# Patient Record
Sex: FEMALE | Race: WHITE | NOT HISPANIC OR LATINO
[De-identification: names, ages, dates, MRNs, and addresses within clinical notes are randomized per-mention and may not be internally consistent; named-entity substitution may affect disease eponyms.]

---

## 2017-05-16 ENCOUNTER — RECORDS - HEALTHEAST (OUTPATIENT)
Dept: ADMINISTRATIVE | Facility: OTHER | Age: 47
End: 2017-05-16

## 2017-05-16 LAB
BKR LAB AP ABNORMAL BLEEDING: NO
BKR LAB AP BIRTH CONTROL/HORMONES: NORMAL
BKR LAB AP CERVICAL APPEARANCE: NORMAL
BKR LAB AP GYN ADEQUACY: NORMAL
BKR LAB AP GYN INTERPRETATION: NORMAL
BKR LAB AP HPV REFLEX: NORMAL
BKR LAB AP LMP: NORMAL
BKR LAB AP PATIENT STATUS: NO
BKR LAB AP PREVIOUS ABNORMAL: NO
BKR LAB AP PREVIOUS NORMAL: NORMAL
HIGH RISK?: NO
PATH REPORT.COMMENTS IMP SPEC: NORMAL
RESULT FLAG (HE HISTORICAL CONVERSION): NORMAL

## 2018-07-20 ENCOUNTER — RECORDS - HEALTHEAST (OUTPATIENT)
Dept: LAB | Facility: CLINIC | Age: 48
End: 2018-07-20

## 2018-07-20 LAB
ANION GAP SERPL CALCULATED.3IONS-SCNC: 10 MMOL/L (ref 5–18)
BUN SERPL-MCNC: 11 MG/DL (ref 8–22)
CALCIUM SERPL-MCNC: 9.2 MG/DL (ref 8.5–10.5)
CHLORIDE BLD-SCNC: 106 MMOL/L (ref 98–107)
CHOLEST SERPL-MCNC: 197 MG/DL
CO2 SERPL-SCNC: 23 MMOL/L (ref 22–31)
CREAT SERPL-MCNC: 0.68 MG/DL (ref 0.6–1.1)
FASTING STATUS PATIENT QL REPORTED: ABNORMAL
GFR SERPL CREATININE-BSD FRML MDRD: >60 ML/MIN/1.73M2
GLUCOSE BLD-MCNC: 89 MG/DL (ref 70–125)
HDLC SERPL-MCNC: 43 MG/DL
LDLC SERPL CALC-MCNC: 125 MG/DL
POTASSIUM BLD-SCNC: 4.4 MMOL/L (ref 3.5–5)
SODIUM SERPL-SCNC: 139 MMOL/L (ref 136–145)
TRIGL SERPL-MCNC: 145 MG/DL
TSH SERPL DL<=0.005 MIU/L-ACNC: 2.61 UIU/ML (ref 0.3–5)

## 2019-02-27 ENCOUNTER — RECORDS - HEALTHEAST (OUTPATIENT)
Dept: ADMINISTRATIVE | Facility: OTHER | Age: 49
End: 2019-02-27

## 2019-02-27 ENCOUNTER — RECORDS - HEALTHEAST (OUTPATIENT)
Dept: LAB | Facility: CLINIC | Age: 49
End: 2019-02-27

## 2019-02-27 LAB
C REACTIVE PROTEIN LHE: 1.4 MG/DL (ref 0–0.8)
ERYTHROCYTE [SEDIMENTATION RATE] IN BLOOD BY WESTERGREN METHOD: 16 MM/HR (ref 0–20)

## 2019-02-28 ENCOUNTER — AMBULATORY - HEALTHEAST (OUTPATIENT)
Dept: SURGERY | Facility: CLINIC | Age: 49
End: 2019-02-28

## 2019-02-28 DIAGNOSIS — E66.01 MORBID OBESITY (H): ICD-10-CM

## 2019-04-18 ENCOUNTER — OFFICE VISIT - HEALTHEAST (OUTPATIENT)
Dept: SURGERY | Facility: CLINIC | Age: 49
End: 2019-04-18

## 2019-04-18 DIAGNOSIS — E66.01 OBESITY, CLASS III, BMI 40-49.9 (MORBID OBESITY) (H): ICD-10-CM

## 2019-04-18 DIAGNOSIS — G47.33 OSA (OBSTRUCTIVE SLEEP APNEA): ICD-10-CM

## 2019-04-18 RX ORDER — AZELASTINE 1 MG/ML
1 SPRAY, METERED NASAL
Status: SHIPPED | COMMUNITY
Start: 2019-04-18

## 2019-04-18 ASSESSMENT — MIFFLIN-ST. JEOR: SCORE: 1704.77

## 2019-05-17 ENCOUNTER — OFFICE VISIT - HEALTHEAST (OUTPATIENT)
Dept: SURGERY | Facility: CLINIC | Age: 49
End: 2019-05-17

## 2019-05-17 DIAGNOSIS — Z71.3 NUTRITIONAL COUNSELING: ICD-10-CM

## 2019-05-17 DIAGNOSIS — E66.01 OBESITY, CLASS III, BMI 40-49.9 (MORBID OBESITY) (H): ICD-10-CM

## 2019-05-17 ASSESSMENT — MIFFLIN-ST. JEOR: SCORE: 1637.18

## 2019-06-14 ENCOUNTER — OFFICE VISIT - HEALTHEAST (OUTPATIENT)
Dept: SURGERY | Facility: CLINIC | Age: 49
End: 2019-06-14

## 2019-06-14 ENCOUNTER — AMBULATORY - HEALTHEAST (OUTPATIENT)
Dept: LAB | Facility: CLINIC | Age: 49
End: 2019-06-14

## 2019-06-14 DIAGNOSIS — E66.01 OBESITY, CLASS III, BMI 40-49.9 (MORBID OBESITY) (H): ICD-10-CM

## 2019-06-14 LAB — VIT B12 SERPL-MCNC: 459 PG/ML (ref 213–816)

## 2019-06-14 ASSESSMENT — MIFFLIN-ST. JEOR: SCORE: 1602.71

## 2019-06-17 ENCOUNTER — COMMUNICATION - HEALTHEAST (OUTPATIENT)
Dept: SURGERY | Facility: CLINIC | Age: 49
End: 2019-06-17

## 2019-06-17 LAB — 25(OH)D3 SERPL-MCNC: 33.5 NG/ML (ref 30–80)

## 2019-07-16 ENCOUNTER — APPOINTMENT (OUTPATIENT)
Age: 49
Setting detail: DERMATOLOGY
End: 2019-07-17

## 2019-07-16 VITALS — HEIGHT: 64 IN | WEIGHT: 220 LBS | RESPIRATION RATE: 16 BRPM

## 2019-07-16 DIAGNOSIS — D22 MELANOCYTIC NEVI: ICD-10-CM

## 2019-07-16 DIAGNOSIS — L82.1 OTHER SEBORRHEIC KERATOSIS: ICD-10-CM

## 2019-07-16 DIAGNOSIS — D18.0 HEMANGIOMA: ICD-10-CM

## 2019-07-16 DIAGNOSIS — L81.4 OTHER MELANIN HYPERPIGMENTATION: ICD-10-CM

## 2019-07-16 PROBLEM — D22.5 MELANOCYTIC NEVI OF TRUNK: Status: ACTIVE | Noted: 2019-07-16

## 2019-07-16 PROBLEM — D18.01 HEMANGIOMA OF SKIN AND SUBCUTANEOUS TISSUE: Status: ACTIVE | Noted: 2019-07-16

## 2019-07-16 PROCEDURE — 99214 OFFICE O/P EST MOD 30 MIN: CPT

## 2019-07-16 PROCEDURE — OTHER COUNSELING: OTHER

## 2019-07-16 PROCEDURE — OTHER SUNSCREEN RECOMMENDATIONS: OTHER

## 2019-07-16 ASSESSMENT — LOCATION DETAILED DESCRIPTION DERM
LOCATION DETAILED: RIGHT MID-UPPER BACK
LOCATION DETAILED: RIGHT SUPERIOR UPPER BACK
LOCATION DETAILED: RIGHT SUPERIOR MEDIAL UPPER BACK

## 2019-07-16 ASSESSMENT — LOCATION SIMPLE DESCRIPTION DERM: LOCATION SIMPLE: RIGHT UPPER BACK

## 2019-07-16 ASSESSMENT — LOCATION ZONE DERM: LOCATION ZONE: TRUNK

## 2019-08-30 ENCOUNTER — OFFICE VISIT - HEALTHEAST (OUTPATIENT)
Dept: SURGERY | Facility: CLINIC | Age: 49
End: 2019-08-30

## 2019-08-30 DIAGNOSIS — E66.812 CLASS II OBESITY: ICD-10-CM

## 2019-08-30 DIAGNOSIS — G47.33 OSA ON CPAP: ICD-10-CM

## 2019-08-30 ASSESSMENT — MIFFLIN-ST. JEOR: SCORE: 1566.42

## 2019-11-27 ENCOUNTER — RECORDS - HEALTHEAST (OUTPATIENT)
Dept: ADMINISTRATIVE | Facility: OTHER | Age: 49
End: 2019-11-27

## 2019-12-23 ENCOUNTER — COMMUNICATION - HEALTHEAST (OUTPATIENT)
Dept: FAMILY MEDICINE | Age: 49
End: 2019-12-23

## 2019-12-23 DIAGNOSIS — E66.01 OBESITY, CLASS III, BMI 40-49.9 (MORBID OBESITY) (H): ICD-10-CM

## 2020-01-02 ENCOUNTER — COMMUNICATION - HEALTHEAST (OUTPATIENT)
Dept: SURGERY | Facility: CLINIC | Age: 50
End: 2020-01-02

## 2020-01-02 DIAGNOSIS — E66.01 OBESITY, CLASS III, BMI 40-49.9 (MORBID OBESITY) (H): ICD-10-CM

## 2020-03-11 ENCOUNTER — OFFICE VISIT - HEALTHEAST (OUTPATIENT)
Dept: SURGERY | Facility: CLINIC | Age: 50
End: 2020-03-11

## 2020-03-11 DIAGNOSIS — E66.01 OBESITY, CLASS III, BMI 40-49.9 (MORBID OBESITY) (H): ICD-10-CM

## 2020-03-11 ASSESSMENT — MIFFLIN-ST. JEOR: SCORE: 1629.93

## 2020-08-24 ENCOUNTER — APPOINTMENT (OUTPATIENT)
Dept: URBAN - METROPOLITAN AREA CLINIC 260 | Age: 50
Setting detail: DERMATOLOGY
End: 2020-08-25

## 2020-08-24 VITALS — HEIGHT: 63 IN | RESPIRATION RATE: 16 BRPM | WEIGHT: 234 LBS

## 2020-08-24 DIAGNOSIS — L71.0 PERIORAL DERMATITIS: ICD-10-CM

## 2020-08-24 PROCEDURE — 99213 OFFICE O/P EST LOW 20 MIN: CPT

## 2020-08-24 PROCEDURE — OTHER ADDITIONAL NOTES: OTHER

## 2020-08-24 PROCEDURE — OTHER COUNSELING: OTHER

## 2020-08-24 PROCEDURE — OTHER PRESCRIPTION: OTHER

## 2020-08-24 RX ORDER — METRONIDAZOLE 7.5 MG/G
0.75% CREAM TOPICAL BID
Qty: 1 | Refills: 1 | Status: ERX | COMMUNITY
Start: 2020-08-24

## 2020-08-24 RX ORDER — MINOCYCLINE HYDROCHLORIDE 100 MG/1
100MG CAPSULE ORAL BID
Qty: 60 | Refills: 0 | Status: ERX | COMMUNITY
Start: 2020-08-24

## 2020-08-24 ASSESSMENT — LOCATION ZONE DERM: LOCATION ZONE: FACE

## 2020-08-24 ASSESSMENT — LOCATION SIMPLE DESCRIPTION DERM: LOCATION SIMPLE: LEFT CHEEK

## 2020-08-24 ASSESSMENT — LOCATION DETAILED DESCRIPTION DERM: LOCATION DETAILED: LEFT CENTRAL BUCCAL CHEEK

## 2020-08-24 NOTE — PROCEDURE: ADDITIONAL NOTES
Additional Notes: LL recommended discontinuing Fluoride toothpaste to see if that helps or hurts the perioral dermatitis. Explained that patient should come back if the area is not better in one week.
Detail Level: Simple

## 2020-09-17 ENCOUNTER — COMMUNICATION - HEALTHEAST (OUTPATIENT)
Dept: SURGERY | Facility: CLINIC | Age: 50
End: 2020-09-17

## 2020-10-15 ENCOUNTER — OFFICE VISIT - HEALTHEAST (OUTPATIENT)
Dept: SURGERY | Facility: CLINIC | Age: 50
End: 2020-10-15

## 2020-10-15 ENCOUNTER — RECORDS - HEALTHEAST (OUTPATIENT)
Dept: ADMINISTRATIVE | Facility: OTHER | Age: 50
End: 2020-10-15

## 2020-10-15 DIAGNOSIS — E66.01 MORBID OBESITY WITH BMI OF 40.0-44.9, ADULT (H): ICD-10-CM

## 2020-10-15 DIAGNOSIS — G47.33 OSA ON CPAP: ICD-10-CM

## 2020-10-15 DIAGNOSIS — E66.01 OBESITY, CLASS III, BMI 40-49.9 (MORBID OBESITY) (H): ICD-10-CM

## 2020-10-15 RX ORDER — PHENTERMINE HYDROCHLORIDE 37.5 MG/1
TABLET ORAL
Qty: 60 TABLET | Refills: 0 | Status: SHIPPED | OUTPATIENT
Start: 2020-10-15

## 2021-05-27 NOTE — PROGRESS NOTES
[facility]:  Non-Surgical Weight Loss Intake Appointment   Date of visit: 4/18/2019  Physician: Raudel Velazquez MD  Primary Care is Meri Marinelli MD.  Ivett Aden   48 y.o.  female  Ivett is a 48 y.o. year old female who is here today for consultation regarding non-surgical weight loss.   she was referred to the [facility] by Dr. Chavez and Yves.    Weight History:   Wt Readings from Last 3 Encounters:   04/18/19 (!) 245 lb 8 oz (111.4 kg)     Body mass index is 44.19 kg/m .       Assessment and Plan   Assessment: Ivett Aden is a 48 y.o.,female presenting for assistance with medical weight loss.  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     She's been dealing with sensitivities in her calves that she believes relates to some pelvic tilt problems and she's been advised to avoid elliptical use recently and has some lipedema issues that she is planning to follow up with at the vascular clinic once some weight loss has occurred for help w/ compression hose/therapy.       Plan:  1.  Behavior Goals: 3 daily meals plan, ideally with a large breakfast, medium lunch        and smaller dinner. Avoidance of sugared-sweetened beverages and processed        carbohydrate snacks.  Work towards pre-planning meals to avoid falling back into old              habits/obesogenic habits.  Daily Food Tracking and morning weight recommended.  Weekly       check-in through MyChart to increase compliance.    2.  Diet Goals: Recommend an increased protein intake w/ 20g-25g per meal to start until she meets w/ dieticina.  Her smoothie use in the am/lunch has been a little too heavy on fruit and discussed mixing in more veggies/healthy fat and protein if going to use smoothie.      3.  Exercise/Activity Goals: limited currently but has home fitness gym and will work on upper body exercises as tolerated, gets about 10k steps daily at work..  Long term goal of increasing endurance to allow for at least  200 minutes weekly of moderate exercise to maintain weight loss.      4.  Recommendation regarding weight loss medication:  Trial of half tab phentermine until our follow up.     5.  Referral to Dietician for further education and customization of diet plan.    6.  Stress Reduction: 4, 7, 8 breathing encouraged and demonstrated. In the past relied on Friday night wine which we'll look to reduce to sporadic use.    7.  Intake Labs:  States she just had some labs done at her PCP at [facility], we'll try to get a release of these and add tests if needed.      8.   EMMA: on cpap. She was advised that given her EMMA her indication for bariatric surgery extends down to a BMI of 35. She's not interested in surgical tools at this time but may be an option in the future if struggling. Sleeve gastrectomy may be a good option for her given lack of metabolic disease.    There are no diagnoses linked to this encounter.     No follow-ups on file.     Weight and Lifestyle History    Sleep history:  Goes to bed around 11pm, in bed by 10-10:30pm, read/candy crush. Using cpap now. In the night, 20 year old 1-5 times nightly, needs to adjust limbs, after couple minutes falls asleep. Wakes up for the day at 5:15am to an alarm. Once awake, showers, gets son up and showers/dresses him. 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 Most mornings has breakfast around 7:40am toast or smoothie (water, strawberries, bananas, blackberries, spinach, vanilla herbal life powder). Will bring smoothie for lunch or will have salad and chicken. Lunch is 11:45. School out at 3:15pm. Dove chocolate couple daily, sweets. Couple days weekly will go  son at 4pm or 5:15pm (will sit at school). Supper is between 5-6 pm: elk or chicken. Fish. Evening routine is cleaning up, getting kid to scouts/homeworks/chores. 1-2 days weekly TV. Hunt and fish, turkey hunting and deer hunting.  Currently use of elliptical is off limits due to calf/pelvic tilt issues. chiro a couple times monthly and trying to do home exercises but lack of time/energy prevents full compliance w/ hour.  Hours per night: see above, fragmented due to care duties  Snoring/apnea/insomnia? Yes, on cpap now  Restful/refreshed? no  Shift work? no  CPAP/BiPAP? yes  Sleep study previously? yes  TV in bedroom? yes  Sleep aids/pills? no      STOP-BANG score for sleep apnea : na  For general population   EMMA - Low Risk : Yes to 0 - 2 questions  EMMA - Intermediate Risk : Yes to 3 - 4 questions  EMMA - High Risk : Yes to 5 - 8 questions  or Yes to 2 or more of 4 STOP questions + male gender  or Yes to 2 or more of 4 STOP questions + BMI > 35kg/m2  or Yes to 2 or more of 4 STOP questions + neck circumference 17 inches / 43cm in male or 16 inches / 41cm in female    Weight History:  In what way is your excess weight affecting your wellness/health? Legs hurt, sleep apnea  Heaviest weight: near current levels  Any Previous weight loss, what was the most lost and method: WWs online, no previous medication support  Birth weIight, High School graduation weight: 140s  Lowest adult weight: na  Maternal health/smoking/weight: na  When did you start gaining weight and what were the circumstances? Gradual gain  Is anyone else in  "your immediate family overweight? na  Finally,  Is there a weight you desire to be, what is your goal weight that would define successful weight loss for you? Around 200 lbs would be \"fantastic\"   I would like to lose weight so I can  :  Feel better   .     Barriers to weight loss:  Is anyone else at home/work/family a barrier to your weight loss? 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  Current stressors include: care taking  Mobility problems: legs/calves bother her    Counseled on health benefits of weight loss, goals for metabolic/diabetic risk reduction, HTN reduction, improved sleep and mobility, cancer reduction, longevity.    Fitness History:  Were you ever an athlete?na     Which sports? na  When was the last time you walked/hiked a mile?na  Do you have any limitations for activity?calves hurt all the time for unclear reasons.  Do you have access to a gym, pool, club, fitness center, or ?gym at home                Do you have any fitness goals/dreams that could motivate your weight loss?na    On a scale from 0-10,  how willing are you to start some sort of movement/exercise regimen? na    Counseled on physiologic benefits of exercise and for long term weight maintenance, goal working towards 200-300 minutes weekly.     Food History:  Who buys groceries?  She does  Do you eat at dinner table, is the TV on or off, family present? na  Any soda, how much? no  Meals per day? 2-3  Snacks per day? 2  Breakfast typically is: see above, on the go  Lunch typically is: smoothie or chicken on salad brought from home  Dinner  is: meat/veggie, Friday night is pizza night  Weekly Fast Food/dining out: rarely.  Snacks consist of: sweets/chocolate    Food sensitivities/allergies/intolerances/avoidances: no  Water per day? 2 big containers daily.    Any binging behavior?no  Any induced vomiting/purging? no  Any history of anorexia/bulimia? " "no  Any night eating issues? no  Stress induced eating? yes    Goal Setting:  Short Term Goals 10% loss is 24 lbs, under 221 lbs,   Long Term Goals under 200 lbs.          Patient Profile   Social History     Social History Narrative     Not on file     History reviewed. No pertinent family history.       Past Medical History   There is no problem list on file for this patient.    Cephalexin; Clindamycin; and Penicillins  Current Outpatient Medications   Medication Sig     azelastine (ASTELIN) 137 mcg (0.1 %) nasal spray 1 spray.     CALCIUM-MAGNESIUM-ZINC ORAL Take by mouth.     cholecalciferol, vitamin D3, (VITAMIN D3 ORAL) Take 125 mcg by mouth daily.            fluticasone propionate (FLONASE) 50 mcg/actuation nasal spray      Lactobacillus acidophilus (PROBIOTIC ORAL) Take by mouth.     levonorgestrel (MIRENA) 20 mcg/24 hours (5 yrs) 52 mg IUD 1 each by Intrauterine route.     NON FORMULARY daily.     vitamin E acetate (VITAMIN E ORAL) Take by mouth.       Past Surgical History  She has no past surgical history on file.     Examination   /82 (Patient Site: Right Arm, Patient Position: Sitting, Cuff Size: Adult Large)   Pulse 70   Ht 5' 2.5\" (1.588 m)   Wt (!) 245 lb 8 oz (111.4 kg)   SpO2 99%   Breastfeeding? No   BMI 44.19 kg/m    Height: 5' 2.5\" (1.588 m) (4/18/2019 10:25 AM)  Initial Weight: 245.5 lbs (4/18/2019 10:25 AM)  Weight: (!) 245 lb 8 oz (111.4 kg) (4/18/2019 10:25 AM)  Weight loss from initial: 0 (4/18/2019 10:25 AM)  % Weight loss: 0 % (4/18/2019 10:25 AM)  BMI (Calculated): 44.2 (4/18/2019 10:25 AM)  SpO2: 99 % (4/18/2019 10:25 AM)  Waist Circumference (In): 47 Inches (4/18/2019 10:25 AM)  Hip Circumference (In): 55 Inches (4/18/2019 10:25 AM)  Neck Circumference (In): 15 Inches (4/18/2019 10:25 AM)    General:  Alert and ambulatory, appears defensive/tired   HEENT:  No conjunctival pallor, moist mucous Membranes, neck is thick, mallampati 3 airway  Pulmonary:  Normal respiratory " effort, no cough, no audible wheezes/crackles.  CV:  Regular rate and Rhythm, no murmurs, pulses 2 plus  Abdominal: soft, obese, non tender, negative oliveira sign  Extremities: no tremor. Tender legs without pitting edema  Skin:  No pallor or jaundice  Pscyh/Mood: fatigued affect/borderline despondency/careworn.                                    LABS: needs to be ordered    Last recorded labs include:  No results found for: WBC, HGB, HCT, MCV, PLT   No results found for: GITBYJTS16RZ No results found for: HGBA1C   Lab Results   Component Value Date    CHOL 197 2018    No results found for: PTH      No results found for: FERRITIN   Lab Results   Component Value Date    HDL 43 (L) 2018      No results found for: HEBKMPMO25 No results found for: 98388   Lab Results   Component Value Date    LDLCALC 125 2018    Lab Results   Component Value Date    TSH 2.61 2018    No results found for: FOLATE   Lab Results   Component Value Date    TRIG 145 2018    No results found for: ALT, AST, GGT, ALKPHOS, BILITOT No results found for: TESTOSTERONE     No components found for: CHOLHDL No results found for: 7597   @resusfast(vitamin a: 1)@       Other Notables/imaging:     Counselin minutes spent in direct consultation with the patient regarding conditions contributing to excess weight accumulation, with over 50% of the time spent in counseling, goal setting and initiating a plan to lose their excess weight.    Raudel Velazquez MD  [facility].  2019

## 2021-05-28 NOTE — PROGRESS NOTES
Medical  Weight Loss Initial Diet Evaluation    Ivett is a 48 y.o. year old female presenting today for a new weight management nutrition consultation. Pt has also had an initial appointment with Bariatrician Dr. Velazquez.   Patient has signed up for weight watchers and has been successful with this. Weight goal is less than 200lb. Patient is taking phentermine occasionally.   Nutrition Assessment:   Anthropometrics:  Pt's Initial Weight: 245.5 lbs  Weight: (!) 230 lb 9.6 oz (104.6 kg)  Weight loss from initial: 14.9  % Weight loss: 6.07 %    BMI: Body mass index is 41.51 kg/m .  IBW: 110-120lb  Estimated RMR (Catron-St Jeor equation): 1648  Estimated protein needs (.5 grams to .9 grams per pound IBW): 55-99g    Medical History:  There is no problem list on file for this patient.    Nutrition History:   Food allergies/intolerances/restrictions: No  Biggest weight loss struggle per pt: sweet tooth   Vitamins/Mineral Supplementation: vitamin E, vitamin D, calcium-magnesium-zinc, fiber gummies, probiotic, sometimes multivitamin     Dietary Recall:  Breakfast: 2 eggs and mushrooms  Snack: apple  Lunch: protein shake  Snack: none  Dinner: 1 chiledo- tortilla with cheese and chili  Snack: none  Overnight eating: No    Hydration (type/oz. per day):  Water: 48oz   Caffeine/carbonation: 1 can diet soda per day  Juice: none  Alcohol : none    Exercise:  Routine exercise established: No  Daily walking   Nutrition Diagnosis (PES statement):   (NC-3.3.5) Obese, class III, BMI ?40 related to physical inactivity as evidenced by Infrequent, low-duration and or low intensity physical activity; and Large amounts of sedentary activities; no structured physical activity regimen    Nutrition Intervention:  1. Discussed with patient how to build a meal: the importance of including a lean/low fat protein at each meal, include a source of vegetables at a minimum of lunch and dinner, and limiting carbohydrate intake to 1 serving (15  grams) per meal.  2. Placed emphasis on importance of developing a healthy eating routine, aiming for 3 meals a day and no snacks.   3. Educated on sources of lean protein, portion sizes, and the amount of grams found in each source. Recommend pt to aim for 20-30 grams of protein at each meal; 60-80 grams per day.  4. Discussed using a protein supplement as a meal replacement; provided product examples.   5. Encouraged importance of developing routine exercise for health benefits and weight loss.   6. Discussed importance of adequate hydration, with emphasis on drinking 64 oz of water or zero calorie containing beverages per day.   Handouts provided:  Plate Method  List of Lean Protein Sources  Protein Supplements  Nutrition Monitoring/Evaluation:   Nutrition Goal Established by Patient:  1. Increase exercise when able.  2. Follow plate method   Follow up/Monitoring:  Will monitor weight change, protein intake, hydration, fruit and vegetable intake and exercise for next visit.  Follow up with RD in 2 months      Time In: 10:30a  Time Out: 11:00a  ABN: Yes

## 2021-05-29 NOTE — TELEPHONE ENCOUNTER
Left message regarding normal labs, encouraged to average about 2000IUs daily of D3.  Raudel Velazquez MD

## 2021-05-29 NOTE — PATIENT INSTRUCTIONS - HE
"Plan:  1. Great job with nearly 10% weight reduction in just under 2 months. Continue good eating strategy. Aiming for 55-99g of protein daily, max of 35 g in single sitting.  2. Phentermine refilled at [facility].  3. Continue CPAP.   4. Aiming for 64 oz water daily.      Information about the Weight Loss Medication Phentermine    When combined with a commitment to diet and behavior changes, weight loss medications can be a nice additional tool to maximize your weight loss season.  There are no magic pills and without diet and behavior changes, weight loss will be minimal.  Think of this medication as a tool to make your diet and behavior changes easier and you'll enjoy a higher probability of success.  Remember not to skip meals, but use this medication to tolerate your reduced calories more easily.  If you are very hungry in the evenings, you are likely not eating enough in the first 10 hours of your day and need to focus on getting your protein requirements in at each of your 3 daily meals.      Phentermine is a stimulant medication related to the amphetamine class of medication but with a lower risk of dependence and addiction.  It is used for weight loss by suppressing the appetite region of the brain.  It also may speed up the metabolic rate and give a person more energy.  Like any medication there are potential side effects and the most common are:  Dry mouth occurs in almost everyone (hydrate well), fewer people experience Palpatations, fast heart rate, elevation of blood pressure, restlessness, insomnia, dizziness, change in mood, tremor, headache, changes in bowel movements,itchiness, changes in sex drive.    Some people can develop serious side effects which include:  Heart strain (\"ischemia\").  Tachycardia (fast heart rate or irregular heart rate).  Hypertension  Pulmonary Hypertension  Psychosis  Dependency and abuse has occurred in some.  If you've been on high dose (37.5mg) for long periods, phentermine " should be tapered down over a few weeks before abruptly stopping as seizures have been reported rarely.    We do not recommend taking it in combination with the following medications due to potential drug interactions which can increase the risk of side effects and/or potential for seizures:    Absolutely contraindicated are:  Amphetamines or other stimulants like ADHD medication: (dextroamphetamine, amphetamine, diethylpropion, isocarboxazid, methamphetamine, lisdexamfetamine, benzphetamine,dexmethylphenidate, methylphenidate, selegiline patch, sibutramine, tranylcypromine.    Avoid use with:   Dopamine, dobutamine, ephedra, ephedrine, epinephrine, isoproterenol, linezolid, norepinephrine, phenylephrine injection, venlafaxine (Effexor).    Monitor or modify dose with:  Acebutolol, atenolol, betaxolol, bisoprolol, carvedilol, droxidopa, esmolol, labetalol, magnesium citrate, metoprolol, nadolol, nebivolol, penbutolol, pindolol, propranolol, sotalol, timolol.    Caution with: armodafinil,betaxolol eye drops, brexpiprazole, bupropion, busulfan, caffeine, carteolol drops, enzalutaminde, ginseng, green tea, guarana, levobunolol drops, lindane cream, modafinil, afrin nasal spray (oxymetazoline), pamabrom, phenylephrine oral and nasal spray, pseudoephedrine (sudafed), rasgiline, sleegiline, bowel prep, tiagabine, timolol drops,  TRAMADOL due to increased risk of seizures.    The current cheapest place to fill your prescription is at [facility], [facility] or [facility] and is around $30 for 90 tablets.  Occasionally, [facility] and [facility] have price matched, so call around and get the best price for you.  Other pharmacies may charge closer to$70- $100 for the same prescription. You don't have to be a member to use the pharmacy at [facility] currently.  An alternative some patients have tried is using a voucher system through [employer].  $25 paid on their website gets you a  voucher that can allows you to pick your meds up at [facility]  without paying anything more.    Dosing:  We start with half a tablet for the first 2 weeks and if tolerating it without problems, you can take up to one full tablet daily in the morning after breakfast.  For those with evening hunger problems, sometimes half a tablet in the morning and half a tablet around 1-2 pm can be effective, however, risks of nighttime insomnia/restless increase with afternoon dosing so call me at the clinic if considering this regimen or having any issues.  You only have to use the amount effective for you, not to exceed one full tablet.  It can also be used situationaly and does not have to be taken every day. As always, if any questions give us a call at the [facility] telephone:  [phone].      Exercise Guidance    Nearly everything that bothers us gets better when the proper amount of exercise can be done in the proper amounts.  Getting to that level safely and without injury is the key.  When it comes to weight loss, exercise is especially important in maintaining the weight loss.  Unfortunately, one of the harsh realities is that substantial weight loss slows our metabolism, often anywhere from 5-20%.    Our brain always remembers our heaviest weight and we can return to that if we're not mindful and moving regularly.  Our biology doesn't understand the concept of having too much energy, only not having enough.  As such, when we lose weight, it's thought that the brain interprets this as we're ill or in a famine and dials back our metabolism to limit further weight loss.  This is why exercise is so important in keeping the weight off and is the main reason people have some weight regain from their low weight point after weight loss.  We have to make up that 10-20% of calories not being burned.Since we can restrict our intake for only so long, exercise becomes very important in our long term healthy weigh maintenance to balance out the occasional  indiscretion.    Generally, for every 5% body weight reduction in a weight loss season, a person needs to add  kilocalories of exercise in their daily routine to keep that weight off for the long term.  This is why it's vital to be starting your fitness regimen during weight loss season, it becomes so vital for long term success in maintaining that weight loss.    Additionally, all sorts of good enzymes and genes turn on with exercise and our stress, sleep, mood and bodies feel better when we can get to the point of making ourselves a little sweaty and short of breath 35-50 minutes most days of the week. But we have to start with what we can do first and give ourselves permission to work our way up to this goal.    Who isn't ready for exercise? Well, if you get severe dizziness/palpitations, chest pain or short of breath/faint with even minimal activity like walking across a room or you're having to pause while going up a flight of stairs, then getting your heart and/or lungs fully evaluated prior to starting an exercise regimen is recommended. Everyone else can probably start a program, but everyone may start at a different point:  Some can set a 5-10 minute walking goal and others will be able to ride their bike for an hour.      Start with where you're at and look to add 10% more each week until you're at that 150 minutes or more a week (or 75 minutes/week or more of vigorous exercise). Moderate exercise can be estimated as the pace you can carry on a conversation and vigorous is the pace at which you can get 3-5 words out before having to take a breath.  If you're using heart rate monitoring, Moderate is about 60% of your maximum heart rate and vigorous about 75%. (Max heart rate estimated as 220 beats minus your age:  Example: 220-age of 44 =176 Beats per minute (BPM) maximum. 0.6X 176= 105 BPM (moderate), 132 BMP(vigorous)).    If you like to count steps, the 10,000 steps per day does correlate well with  weight maintenance but try to make at least 20-25% of those steps at a brisk pace (like you are about to miss your bus).    If you like to use Apps on the phone, the couch to 5k [place]incoln and 7 minute workout apps are nice places to start if you are reasonably healthy.  There are hundreds of other options out there.  Consider viewing [employer] if gentler exercise/movement is desired. Videos on [place] and chair yoga for seniors exist and are free. Check them out and let's get that 3-4 days a week routine going.    Let's move!  Raudel Velazquez MD.

## 2021-05-29 NOTE — PROGRESS NOTES
"Bariatric Clinic Follow-Up Visit:    Ivett Aden is a 49 y.o.  female with Body mass index is 40.14 kg/m .  presenting here today for follow-up on non-surgical efforts for weight loss. Original Intake visit occurred on 4/18/19 with a weight of 245 lbslbs and BMI of 44.2.  Along with diet and behavior changes, she has been using half tab phentermine to assist her weight loss goals.  See her intake visit notes for details on identified contributors to weight gain in the past.    Weight:   Wt Readings from Last 2 Encounters:   06/14/19 (!) 223 lb (101.2 kg)   05/17/19 (!) 230 lb 9.6 oz (104.6 kg)    pounds  Height: 5' 2.5\" (1.588 m) (6/14/2019 11:01 AM)  Initial Weight: 245 lbs (6/14/2019 11:01 AM)  Weight: (!) 223 lb (101.2 kg) (6/14/2019 11:01 AM)  Weight loss from initial: 22 (6/14/2019 11:01 AM)  % Weight loss: 8.98 % (6/14/2019 11:01 AM)  BMI (Calculated): 40.1 (6/14/2019 11:01 AM)  SpO2: 100 % (6/14/2019 11:01 AM)  Waist Circumference (In): 47 Inches (4/18/2019 10:25 AM)  Hip Circumference (In): 55 Inches (4/18/2019 10:25 AM)  Neck Circumference (In): 15 Inches (4/18/2019 10:25 AM)      Comorbidities:There is no problem list on file for this patient.      Current Outpatient Medications:      azelastine (ASTELIN) 137 mcg (0.1 %) nasal spray, 1 spray., Disp: , Rfl:      CALCIUM-MAGNESIUM-ZINC ORAL, Take by mouth., Disp: , Rfl:      cholecalciferol, vitamin D3, (VITAMIN D3 ORAL), Take 125 mcg by mouth daily.    , Disp: , Rfl:      fluticasone propionate (FLONASE) 50 mcg/actuation nasal spray, , Disp: , Rfl: 6     Lactobacillus acidophilus (PROBIOTIC ORAL), Take by mouth., Disp: , Rfl:      levonorgestrel (MIRENA) 20 mcg/24 hours (5 yrs) 52 mg IUD, 1 each by Intrauterine route., Disp: , Rfl:      NON FORMULARY, daily., Disp: , Rfl:      phentermine (ADIPEX-P) 37.5 mg tablet, Start with 1/2 a tablet daily (18.75mg)., Disp: 60 tablet, Rfl: 0     vitamin E acetate (VITAMIN E ORAL), Take by mouth., Disp: , Rfl: " "      Interim: Since our last visit, she has had wonderful success thus far, down 22 lbs, 9% total body weight. Tolerating phentermine apart from some dry mouth that she's hydrating well w/ water about 48 oz daily and one diet soda most days. Encouraged to drive water intake up to 64 oz. Not much exercise/home gym use yet. Still not \"cleared for PT in the legs\".  RMR of 1648kcal and protein goal of 55-99g/day.  She's down another 7 lbs since working w/ our dietician  5/17/19.   Continues to use cpap regularly.   Plan:   1.  Great job with nearly 10% weight reduction in just under 2 months. Continue good eating strategy. Aiming for 55-99g of protein daily, max of 35 g in single sitting.  2. Phentermine refilled at [email or web address].  3. Continue CPAP.   4. Aiming for 64 oz water daily.      We discussed [facility] Bariatric Basics including:  -eating 3 meals daily  -eating protein first  -eating slowly, chewing food well  -avoiding/limiting calorie containing beverages  -We discussed the importance of restorative sleep and stress management in maintaining a healthy weight.  -We discussed the National Weight Control Registry healthy weight maintenance strategies and ways to optimize metabolism.  -We discussed the importance of physical activity including cardiovascular and strength training in maintaining a healthier weight and explored viable options.    Most recent labs:  No results found for: WBC, HGB, HCT, MCV, PLT  Lab Results   Component Value Date    CHOL 197 07/20/2018     Lab Results   Component Value Date    HDL 43 (L) 07/20/2018     Lab Results   Component Value Date    LDLCALC 125 07/20/2018     Lab Results   Component Value Date    TRIG 145 07/20/2018     No components found for: CHOLHDL  No results found for: ALT, AST, GGT, ALKPHOS, BILITOT  No results found for: HGBA1C  No results found for: ZDYHHXQH09  No results found for: [id number]  No results found for: FERRITIN  No results found for: PTH  No results found " "for: 85305  No results found for: 7597  Lab Results   Component Value Date    TSH 2.61 07/20/2018     No results found for: TESTOSTERONE    DIETARY HISTORY    Positive Changes Since Last Visit: steady w/ diet  Struggling With: exercise/ongoing calf pains.    Knowledgeable in Reading Food Labels: improved  Getting Adequate Protein: improved  Sleeping 7-8 hours/day often  Stress management good    PHYSICAL ACTIVITY PATTERNS:  Cardiovascular: limited  Strength Training: limited    REVIEW OF SYSTEMS  GENERAL/CONSTITUTIONAL:  nl  HEENT:   nl  CARDIOVASCULAR:   nl  PULMONARY:   nl  GASTROINTESTINAL:  nl  UROLOGIC:  nl  NEUROLOGIC:  nl  PSYCHIATRIC:  nl  MUSCULOSKELETAL/RHEUMATOLOGIC   stable calf pain  ENDOCRINE:  na  DERMATOLOGIC:  No rash.    PHYSICAL EXAM:  Vitals: /84 (Patient Site: Right Arm, Patient Position: Sitting, Cuff Size: Adult Large)   Pulse 72   Ht 5' 2.5\" (1.588 m)   Wt (!) 223 lb (101.2 kg)   SpO2 100%   Breastfeeding? No   BMI 40.14 kg/m    Height: 5' 2.5\" (1.588 m) (6/14/2019 11:01 AM)  Initial Weight: 245 lbs (6/14/2019 11:01 AM)  Weight: (!) 223 lb (101.2 kg) (6/14/2019 11:01 AM)  Weight loss from initial: 22 (6/14/2019 11:01 AM)  % Weight loss: 8.98 % (6/14/2019 11:01 AM)  BMI (Calculated): 40.1 (6/14/2019 11:01 AM)  SpO2: 100 % (6/14/2019 11:01 AM)  Waist Circumference (In): 47 Inches (4/18/2019 10:25 AM)  Hip Circumference (In): 55 Inches (4/18/2019 10:25 AM)  Neck Circumference (In): 15 Inches (4/18/2019 10:25 AM)      GEN: Pleasant, well groomed, in no acute distress  HEENT: PEERL, EOMI, airway clear .  NECK: No swelling.  HEART: Rhythm regular, rate regular, no murmur   LUNGS: Clear without crackles or wheezes. No cough.  ABDOMEN: obese..  EXTREMITIES: tender calves, no pitting edema. Varicosities without palpable cords. palpable peripheral pulses, 2 plus radial.  NEURO: Alert and Oriented X3, normal gait and speech.  SKIN: No visible rashes, pallor or jaundice.        25 minutes was " spent in direct consultation, with over 50% of it spent in counseling regarding their plan for excess weight reduction and health modification.  Raudel Velazquez MD  [facility]  5:18 PM

## 2021-05-31 NOTE — PATIENT INSTRUCTIONS - HE
"Plan:  1.  Continue good progress and mindful eating/walking as able.  If the legs don't do as well walking, strength training will help preserve your strength/weight ratio by doing at least 2-3 days weekly.    2. Call when refill of phentermine needed, continue half tablet, blood pressure looks good today.    3. Great job on over 12% total body weight reduction, consider setting reward for breaking the 200 lb abad.     4. Continue cpap for sleep apnea.    Information about the Weight Loss Medication Phentermine    When combined with a commitment to diet and behavior changes, weight loss medications can be a nice additional tool to maximize your weight loss season.  There are no magic pills and without diet and behavior changes, weight loss will be minimal.  Think of this medication as a tool to make your diet and behavior changes easier and you'll enjoy a higher probability of success.  Remember not to skip meals, but use this medication to tolerate your reduced calories more easily.  If you are very hungry in the evenings, you are likely not eating enough in the first 10 hours of your day and need to focus on getting your protein requirements in at each of your 3 daily meals.      Phentermine is a stimulant medication related to the amphetamine class of medication but with a lower risk of dependence and addiction.  It is used for weight loss by suppressing the appetite region of the brain.  It also may speed up the metabolic rate and give a person more energy.  Like any medication there are potential side effects and the most common are:  Dry mouth occurs in almost everyone (hydrate well), fewer people experience Palpatations, fast heart rate, elevation of blood pressure, restlessness, insomnia, dizziness, change in mood, tremor, headache, changes in bowel movements,itchiness, changes in sex drive.    Some people can develop serious side effects which include:  Heart strain (\"ischemia\").  Tachycardia (fast heart " rate or irregular heart rate).  Hypertension  Pulmonary Hypertension  Psychosis  Dependency and abuse has occurred in some.  If you've been on high dose (37.5mg) for long periods, phentermine should be tapered down over a few weeks before abruptly stopping as seizures have been reported rarely.    We do not recommend taking it in combination with the following medications due to potential drug interactions which can increase the risk of side effects and/or potential for seizures:    Absolutely contraindicated are:  Amphetamines or other stimulants like ADHD medication: (dextroamphetamine, amphetamine, diethylpropion, isocarboxazid, methamphetamine, lisdexamfetamine, benzphetamine,dexmethylphenidate, methylphenidate, selegiline patch, sibutramine, tranylcypromine.    Avoid use with:   Dopamine, dobutamine, ephedra, ephedrine, epinephrine, isoproterenol, linezolid, norepinephrine, phenylephrine injection, venlafaxine (Effexor).    Monitor or modify dose with:  Acebutolol, atenolol, betaxolol, bisoprolol, carvedilol, droxidopa, esmolol, labetalol, magnesium citrate, metoprolol, nadolol, nebivolol, penbutolol, pindolol, propranolol, sotalol, timolol.    Caution with: armodafinil,betaxolol eye drops, brexpiprazole, bupropion, busulfan, caffeine, carteolol drops, enzalutaminde, ginseng, green tea, guarana, levobunolol drops, lindane cream, modafinil, afrin nasal spray (oxymetazoline), pamabrom, phenylephrine oral and nasal spray, pseudoephedrine (sudafed), rasgiline, sleegiline, bowel prep, tiagabine, timolol drops,  TRAMADOL due to increased risk of seizures.    The current cheapest place to fill your prescription is at [facility], [facility] or [facility] and is around $30 for 90 tablets.  Occasionally, [facility] and [facility] have price matched, so call around and get the best price for you.  Other pharmacies may charge closer to$70- $100 for the same prescription. You don't have to be a member to use the pharmacy at [facility] currently.   An alternative some patients have tried is using a voucher system through [employer].  $25 paid on their website gets you a  voucher that can allows you to pick your meds up at [facility] without paying anything more.    Dosing:  We start with half a tablet for the first 2 weeks and if tolerating it without problems, you can take up to one full tablet daily in the morning after breakfast.  For those with evening hunger problems, sometimes half a tablet in the morning and half a tablet around 1-2 pm can be effective, however, risks of nighttime insomnia/restless increase with afternoon dosing so call me at the clinic if considering this regimen or having any issues.  You only have to use the amount effective for you, not to exceed one full tablet.  It can also be used situationaly and does not have to be taken every day. As always, if any questions give us a call at the [facility] telephone:  [phone].

## 2021-05-31 NOTE — PROGRESS NOTES
"Bariatric Clinic Follow-Up Visit:    Ivett Aden is a 49 y.o.  female with Body mass index is 38.7 kg/m .  presenting here today for follow-up on non-surgical efforts for weight loss. Original Intake visit occurred on 4/18/19 with a weight of 245 lbs and BMI of 44.2.  Along with diet and behavior changes, she has been using phentermine intermittently to assist her weight loss goals.  See her intake visit notes for details on identified contributors to weight gain in the past.    Weight:   Wt Readings from Last 2 Encounters:   08/30/19 215 lb (97.5 kg)   06/14/19 (!) 223 lb (101.2 kg)    pounds  Height: 5' 2.5\" (1.588 m) (8/30/2019 10:55 AM)  Initial Weight: 245 lbs (8/30/2019 10:55 AM)  Weight: 215 lb (97.5 kg) (8/30/2019 10:55 AM)  Weight loss from initial: 30 (8/30/2019 10:55 AM)  % Weight loss: 12.24 % (8/30/2019 10:55 AM)  BMI (Calculated): 38.7 (8/30/2019 10:55 AM)  SpO2: 99 % (8/30/2019 10:55 AM)  Waist Circumference (In): 47 Inches (4/18/2019 10:25 AM)  Hip Circumference (In): 55 Inches (4/18/2019 10:25 AM)  Neck Circumference (In): 15 Inches (4/18/2019 10:25 AM)      Comorbidities:There is no problem list on file for this patient.      Current Outpatient Medications:      azelastine (ASTELIN) 137 mcg (0.1 %) nasal spray, 1 spray., Disp: , Rfl:      CALCIUM-MAGNESIUM-ZINC ORAL, Take by mouth., Disp: , Rfl:      cholecalciferol, vitamin D3, (VITAMIN D3 ORAL), Take 125 mcg by mouth daily.    , Disp: , Rfl:      fluticasone propionate (FLONASE) 50 mcg/actuation nasal spray, , Disp: , Rfl: 6     Lactobacillus acidophilus (PROBIOTIC ORAL), Take by mouth., Disp: , Rfl:      levonorgestrel (MIRENA) 20 mcg/24 hours (5 yrs) 52 mg IUD, 1 each by Intrauterine route., Disp: , Rfl:      phentermine (ADIPEX-P) 37.5 mg tablet, Start with 1/2 a tablet daily (18.75mg)., Disp: 60 tablet, Rfl: 0     vitamin E acetate (VITAMIN E ORAL), Take by mouth., Disp: , Rfl:      NON FORMULARY, daily., Disp: , Rfl:       Interim: " Since our last visit, she has continued losing weight but notes more holiday eating/Fair trips and a bit slowed weight loss as a result. She's walking more/visited [facility] this summer and continues to be mindful about food/protein.  Tolerating phentermine and not currently in need of refill. Continues to use CPAP.  Legs continue to be sore but walkied [id number] at [facility] and managed hiking a bit in [place] as well. Encouraged to use more strength building focus if legs bother her too much.    Plan:   1. Continue good progress and mindful eating/walking as able.  If the legs don't do as well walking, strength training will help preserve your strength/weight ratio by doing at least 2-3 days weekly.    2. Call when refill of phentermine needed, continue half tablet, blood pressure looks good today.    3. Great job on over 12% total body weight reduction, consider setting reward for breaking the 200 lb abad.     4. Continue cpap for sleep apnea.        We discussed [facility] Bariatric Basics including:  -eating 3 meals daily  -eating protein first  -eating slowly, chewing food well  -avoiding/limiting calorie containing beverages  -We discussed the importance of restorative sleep and stress management in maintaining a healthy weight.  -We discussed the National Weight Control Registry healthy weight maintenance strategies and ways to optimize metabolism.  -We discussed the importance of physical activity including cardiovascular and strength training in maintaining a healthier weight and explored viable options.    Most recent labs:  No results found for: WBC, HGB, HCT, MCV, PLT  Lab Results   Component Value Date    CHOL 197 07/20/2018     Lab Results   Component Value Date    HDL 43 (L) 07/20/2018     Lab Results   Component Value Date    LDLCALC 125 07/20/2018     Lab Results   Component Value Date    TRIG 145 07/20/2018     No components found for: CHOLHDL  No results found for: ALT, AST,  "GGT, ALKPHOS, BILITOT  No results found for: HGBA1C  Lab Results   Component Value Date    [id number] 459 06/14/2019     Lab Results   Component Value Date    [id number] 33.5 06/14/2019     No results found for: FERRITIN  No results found for: PTH  No results found for: 71340  No results found for: 7597  Lab Results   Component Value Date    TSH 2.61 07/20/2018     No results found for: TESTOSTERONE    DIETARY HISTORY    Positive Changes Since Last Visit: high 12% weight reduction  Struggling With: holiday eating this last month    Knowledgeable in Reading Food Labels: yes  Getting Adequate Protein: often  Sleeping 7-8 hours/day yes  Stress management good    PHYSICAL ACTIVITY PATTERNS:  Cardiovascular: some walking intermittently   Strength Training: some    REVIEW OF SYSTEMS  GENERAL/CONSTITUTIONAL:  nl  HEENT:   nl  CARDIOVASCULAR:   nl  PULMONARY:   nl  GASTROINTESTINAL:  nl  UROLOGIC:  nl  NEUROLOGIC:  nl  PSYCHIATRIC:  nl  MUSCULOSKELETAL/RHEUMATOLOGIC   nl  ENDOCRINE:  nl  DERMATOLOGIC:  nl    PHYSICAL EXAM:  Vitals: /66 (Patient Site: Right Arm, Patient Position: Sitting, Cuff Size: Adult Small)   Pulse 76   Ht 5' 2.5\" (1.588 m)   Wt 215 lb (97.5 kg)   SpO2 99%   BMI 38.70 kg/m    Height: 5' 2.5\" (1.588 m) (8/30/2019 10:55 AM)  Initial Weight: 245 lbs (8/30/2019 10:55 AM)  Weight: 215 lb (97.5 kg) (8/30/2019 10:55 AM)  Weight loss from initial: 30 (8/30/2019 10:55 AM)  % Weight loss: 12.24 % (8/30/2019 10:55 AM)  BMI (Calculated): 38.7 (8/30/2019 10:55 AM)  SpO2: 99 % (8/30/2019 10:55 AM)  Waist Circumference (In): 47 Inches (4/18/2019 10:25 AM)  Hip Circumference (In): 55 Inches (4/18/2019 10:25 AM)  Neck Circumference (In): 15 Inches (4/18/2019 10:25 AM)      GEN: Pleasant, well groomed, in no acute distress  HEENT: PEERL, EOMI, airway clear .  NECK: No swelling.  HEART: Rhythm regular, rate regular, no murmur   LUNGS: Clear without crackles or wheezes. No cough..  ABDOMEN: " obese.  EXTREMITIES: tender calves at baseline,  palpable peripheral pulses, 2plus radial.  NEURO: Alert and Oriented X3, normal gait and speech.  SKIN: No visible rashes, tanned skin c/w outdoor activities. No pallor or jaundice..        25 minutes was spent in direct consultation, with over 50% of it spent in counseling regarding their plan for excess weight reduction and health modification.  Raudel Velazquez MD  [facility]  11:45 AM

## 2021-06-03 VITALS — WEIGHT: 215 LBS | BODY MASS INDEX: 38.09 KG/M2 | HEIGHT: 63 IN

## 2021-06-03 VITALS — HEIGHT: 63 IN | WEIGHT: 245.5 LBS | BODY MASS INDEX: 43.5 KG/M2

## 2021-06-03 VITALS — WEIGHT: 230.6 LBS | BODY MASS INDEX: 40.86 KG/M2 | HEIGHT: 63 IN

## 2021-06-03 VITALS — BODY MASS INDEX: 39.51 KG/M2 | WEIGHT: 223 LBS | HEIGHT: 63 IN

## 2021-06-04 VITALS
SYSTOLIC BLOOD PRESSURE: 136 MMHG | HEART RATE: 78 BPM | HEIGHT: 63 IN | DIASTOLIC BLOOD PRESSURE: 76 MMHG | OXYGEN SATURATION: 98 % | WEIGHT: 229 LBS | BODY MASS INDEX: 40.57 KG/M2

## 2021-06-04 NOTE — TELEPHONE ENCOUNTER
Called pt to discuss and she says that her pharmacy requested a refill last week.  Upon further investigation, looks like the refill request was sent to her PCP and is still pending.  I let her know that I will re-order it under  and send the request to him.  Pt verbalized understanding.    Camille Rubi RN, CBN  [facility]  P [phone]  F [phone]

## 2021-06-04 NOTE — TELEPHONE ENCOUNTER
Controlled Substance Refill Request  Medication Name:   Requested Prescriptions     Pending Prescriptions Disp Refills     phentermine (ADIPEX-P) 37.5 mg tablet 60 tablet 0     Sig: Start with 1/2 a tablet daily (18.75mg).     Date Last Fill: 06/14/19  Pharmacy:  [id number] [place], [place] - [address]  Submit electronically to pharmacy  Controlled Substance Agreement Date Scanned:   Encounter-Level CSA Scan Date:    There are no encounter-level csa scan date.       Last office visit with prescriber/PCP: Visit date not found OR same dept: Visit date not found OR same specialty: Visit date not found  Last physical: Visit date not found Last MTM visit: Visit date not found

## 2021-06-04 NOTE — TELEPHONE ENCOUNTER
Pts Care Provider: Dr. Velazquez    Caller: Ivett    Phone Number: [phone]  OK to leave message: Yes    Reason for Call: Pt is calling to check on the status of a medication refill request from her pharmacy

## 2021-06-06 NOTE — PATIENT INSTRUCTIONS - HE
Plan:  1. Welcome back and to get to our 200 lb abad goal from last summer, finding our routine with meals/timing and limiting temptation as best we can will help. Aiming for 25 grams of protein 3 meals daily, every 5 hours, hydrating well in between meals and getting your 70-80 oz of water daily.    2. Restart phentermine. Half tablet daily, no later than 1pm if desiring to go to bed by 11pm.    3. Recheck D levels in the summer.    4. Remember not to over restrict, with this style diet, getting about 300-400 calories every 5 hours makes the comfort eating desire less if getting your 25 grams of protein at each meal.            To help lose weight in a safe way and sustainable way, I'd like to start you on a 1300 calorie diet each day.  Understanding that every 3500 calorie deficit adds up to a pound of weight reduction, with the combination of light aerobic exercise, some modest weight training and diet, we should be able to lose around 1 to 2.5lbs weekly depending on your starting height and weight.    Hunger and fatigue are the enemies of weight loss and behavior change in general.  We become much more habit and reactive in our eating when we're hungry and/or tired and frequently have less self control.  It's not a personal failing, it's just body chemistry.   We can combat this by trying to avoid being tired and hungry at the same time.  To help this,  try to front load your calories in the first 10 hours of you day so you get into the fatigued evening hours reasonably full and you can control impulses/mindless eating a lot better and avoid those bedtime snacks/evening treats that the tired brain craves.    Weight loss goes through ups and downs and plateaus but if you stay on the program, you will enjoy success.   Commit to the process, try to be good at least 19 days out of 20 and continue to think about why you're doing this and what you're working towards. If you haven't thought of your reward for hitting  your weight loss goals, think about it now. Using these little victory bribes along the way helps a lot.    Finding a diet that is satisfying and repeatable-- day in and day out, improves success.  An outline of how to break up the day's food is given below.  It is a starting point and example of what a 1300 calorie diet looks like and you can modify the listed foods to suite your particular tastes, but pay attention to portions.   Do not skip breakfast as it will leave you hungry and lead to overeating at some point during the rest of the day.  If you can make Supper the last food for the day more days of the week then not it can help.  That means that those first 10-12 hours of the day and hitting your protein/intake targets for all three meals is vital to your success and evening hunger.    Start reading labels so you know that you're getting what you think you are and start measuring foods so you can eventually look at a portion and understand how it will provide the fuel you want.    Prepping raw veggies after you buy them (washing and putting into bags/tupperware for easy access), cooking several chicken breasts for the next 2-3 lunches and generally being able to grab and go what will keep you on target when time is short will greatly aid your success.  Prepare and plan and success will follow.    Read labels:  for protein portions/yogurt, protein bars etc looking for items with more than 10 grams of protein and less than 10 grams of sugar is very helpful.  Frozen meals should have at least 18 grams of protein, under 10 grams of sugar as well (typically around 300 calories).    Please note: if you've had previous bariatric surgery: wait 20-30 minutes before/after eating to drink your beverages to avoid early fullness/dumping syndrome/worsening malabsorption, early loss of fullness and hunger.  Start with eating your protein first, slow down your meals and chew thoroughly.          1300 calorie diet:  Think  Big Breakfast, Medium Lunch, smaller dinner.    Breakfast goal of 300 calories-350 calories (egg, 1/3 to 1/2 cup cooked old fashioned oatmeal or steel cut oats and berries,3-4oz greek yogurt.)Glass of water and if you like coffee (black) or tea.        Mid morning Snack or part of lunch, about 2-2.5 hrs later: 100 calories (cottage cheese/string cheese/ fruit or banana) and a handful of cruchy/green veggies (cucumber/celery/green peppers/broccoli).  Small glass of water    Lunch:  300 calories (tuna with little salinas (no bread), apple, salad with drizzle of olive oil/balsamic vinegar for example)  Water    Snack:  100 calories.  4-5 oz Greek Yogurt with at least 17 grams of protein per serving.    Or Cottage cheese (lower sodium version preferable). Get this in about 2 hrs before you plan to have dinner. Protein drinks with at least 15-20 grams of protein and less than 6 grams of sugar could be used here to hit protein goals and decrease afternoon/evening hunger.  Glass of water    Dinner:  350 calories (4 oz meat or fish with cooked veggies or salad with minimal dressing, one piece of bread).  Glass of water or unsweetened tea with lemon  Dessert: 100 calories Medium Apple or Small handful of nuts, about 2/3 of an ounce (almonds/walnuts/cashews or pistachios are ideal).   Glass of water.    Try to avoid all soda and juice (low sodium V8 ok once a day).   You can do it!    Choose an activity that is fun/interesting and available to you such as  Going for a swim for 15 minutes, walking 40 minutes, elliptical 20 minutes or cycling 20 minutes as many days a week as you can.  If those times are too long for your fitness level, start at 10 minutes of movement and each week try to increase by 2 minutes each week.  The first 70 minutes a week (10 minutes a day only) of exercise drops the mortality rate of a sedentary person 30%!!!!  Our goal is to work up to 150 minutes or more per week of moderate to vigorous exercise  to  optimize metabolism and prevent weight regain during maintenance. If time is short and your fitness allows it, high intensity interval training can be a nice way to cut the workout time in half:  warm up 2-4 minutes then 3-6 intervals of increased intensity effort (70% of max heart rate) followed by an equal amount or more of recovery before repeating, then 1-3 minutes of cooldown.     10 minutes of weight lifting can be helpful as well or using some body weight exercises like wall squats, pushups (with assistance as needed or standing/wall pushups), seat presses, yoga moves. At least twice weekly helps maintain a good strength to weight ratio, more days is better.  [employer] is a great resource for free video demonstrations.    If you are into strenuous weight lifting or prolonged exercise, use an online calculator for how many calories you've burned and if your exercise is lasting over 60 minutes, replace 20-30% of those calories burned immediately after exercise .  For the more limited exercise (less than 500 calories burned), there is no need to add extra food unless you notice a lot of hunger on your food journal.  Usually  Even a  calorie load after longer workouts is more than adequate.  For longer efforts, hunger will increase if you don't refuel afterwards and can get meal plan off track due to hunger, so replenish immediately after the workout to keep on the diet plan and feeling good.    Exercise example:  If you burned 1000 calories during the exercise, immediately (within 30 minutes) have a snack/replacement beverage totaling 200-300 calories and ideally have a 3:1 ratio of carbohydrate grams to protein grams to keep muscles ready for exercise the next day.  Have your next, full meal within 2-3 hours of exercise.    Tips for success:  KEEP A FOOD JOURNAL and a log of daily weights.  Pencil and paper works fine for most. Otherwise, Myfitnesspal, fitbit, spark, Loseit, garmin are all good tracker  "apps/programs or websites for food/fitness.  Remembering to ask yourself, \"How did my nourishment affect me today\" and comparing \"good days\" to \"hungry days\" to solidify what helps your body, in your life, feel it's best while losing weight.    1.  Prepare proteins ahead of time (broil chicken breasts in bulk so you can grab and go), steel cut oats can be stored in casserole dish/bowl in the fridge for quick scoop in the morning and rewarm in microwave, make use of crock pot recipes (watch salt content).    2.  Drink a 8-12 oz glass of water every 2-3 hours when awake.  We often mistake hunger for thirst, especially when losing weight.    3. Remember your Reward and Motivation when things get hard.    4.  Weigh yourself every morning and record, you'll stay on track better and learn how your body loses weight. Don't worry about 1 or 2 day patterns, but when on track you'll notice good trend downward of weight over 3-4 day segments.    5. Call or use [employer] messaging if problems/concerns .    6.  Find a handful of meals/foods that keep you on track and get into a boring routine that is sustainable for you.    7.  Take a complete multivitamin just to make sure all micronutrients are adequate during weight loss.    8. If losing hair/brittle nails it often means you are not taking enough protein.  Minimum goal is 60 grams daily of protein, most people with normal kidneys do well with upwards of 100-120 grams/day of protein. Consider taking Biotin as supplement or a \"Hair and Nail\" multivitamin.  If you are hypothyroid and losing hair, see you doctor for a check up of your levels if you haven't had one recently.    9.  Getting adequate sleep is very important for starting your day properly, when we are sleep deprived, our morning appetite is suppressed and without eating an adequate breakfast, we overeat later in the day when we're tired.  Our body heals in our sleep and our mental and immune health depends on this " rest.  Aim for 7-8 hours of sleep nightly if possible.  If you sleep is disturbed, perform some introspection on stressors/depression/anxiety/PTSD events or possible sleep disorders and we can trouble shoot solutions/evaulations if issues persist.    Exercise during the day, meditative breathing before bed and after waking and removing the television from the bedroom are easy ways to improve quality of sleep.      10. Relaxation.  Controlled breathing exercises can lower stress levels in the brain.  One technique is 4, 7, 8 breathing:  Place the tip of your tongue behind your front teeth, breath in through your mouth for 4 seconds, Hold it for 7 seconds and breath out slowly, making a leaky tire noise for 8 seconds.  Repeat 4 times.  Ideally do at the start and end of your day or if feeling stressed.  It works and it's why meditation/yoga/martial arts are often very breath based activities.  There are breathing techniques for alertness as well as relaxation out there and they can be quite helpful.          Information about the Weight Loss Medication Phentermine    When combined with a commitment to diet and behavior changes, weight loss medications can be a nice additional tool to maximize your weight loss season.  There are no magic pills and without diet and behavior changes, weight loss will be minimal.  Think of this medication as a tool to make your diet and behavior changes easier and you'll enjoy a higher probability of success.  Remember not to skip meals, but use this medication to tolerate your reduced calories more easily.  If you are very hungry in the evenings, you are likely not eating enough in the first 10 hours of your day and need to focus on getting your protein requirements in at each of your 3 daily meals.      Phentermine is a stimulant medication related to the amphetamine class of medication but with a lower risk of dependence and addiction.  It is used for weight loss by suppressing the  "appetite region of the brain.  It also may speed up the metabolic rate and give a person more energy.  Like any medication there are potential side effects and the most common are:  Dry mouth occurs in almost everyone (hydrate well), fewer people experience Palpatations, fast heart rate, elevation of blood pressure, restlessness, insomnia, dizziness, change in mood, tremor, headache, changes in bowel movements,itchiness, changes in sex drive.    Some people can develop serious side effects which include:  Heart strain (\"ischemia\").  Tachycardia (fast heart rate or irregular heart rate).  Hypertension  Pulmonary Hypertension  Psychosis  Dependency and abuse has occurred in some.  If you've been on high dose (37.5mg) for long periods, phentermine should be tapered down over a few weeks before abruptly stopping as seizures have been reported rarely.    We do not recommend taking it in combination with the following medications due to potential drug interactions which can increase the risk of side effects and/or potential for seizures:    Absolutely contraindicated are:  Amphetamines or other stimulants like ADHD medication: (dextroamphetamine, amphetamine, diethylpropion, isocarboxazid, methamphetamine, lisdexamfetamine, benzphetamine,dexmethylphenidate, methylphenidate, selegiline patch, sibutramine, tranylcypromine.    Avoid use with:   Dopamine, dobutamine, ephedra, ephedrine, epinephrine, isoproterenol, linezolid, norepinephrine, phenylephrine injection, venlafaxine (Effexor).    Monitor or modify dose with:  Acebutolol, atenolol, betaxolol, bisoprolol, carvedilol, droxidopa, esmolol, labetalol, magnesium citrate, metoprolol, nadolol, nebivolol, penbutolol, pindolol, propranolol, sotalol, timolol.    Caution with: armodafinil,betaxolol eye drops, brexpiprazole, bupropion, busulfan, caffeine, carteolol drops, enzalutaminde, ginseng, green tea, guarana, levobunolol drops, lindane cream, modafinil, afrin nasal spray " (oxymetazoline), pamabrom, phenylephrine oral and nasal spray, pseudoephedrine (sudafed), rasgiline, sleegiline, bowel prep, tiagabine, timolol drops,  TRAMADOL due to increased risk of seizures.    The current cheapest place to fill your prescription is at [facility], [employer] or "[employer]" and is around $30 for 90 tablets.  Occasionally, [employer] and [facility] have price matched, so call around and get the best price for you.  Other pharmacies may charge closer to$70- $100 for the same prescription. You don't have to be a member to use the pharmacy at [facility] currently.  An alternative some patients have tried is using a voucher system through [employer].  $25 paid on their website gets you a  voucher that can allows you to pick your meds up at [facility] without paying anything more.    Dosing:  We start with half a tablet for the first 2 weeks and if tolerating it without problems, you can take up to one full tablet daily in the morning after breakfast.  For those with evening hunger problems, sometimes half a tablet in the morning and half a tablet around 1-2 pm can be effective, however, risks of nighttime insomnia/restless increase with afternoon dosing so call me at the clinic if considering this regimen or having any issues.  You only have to use the amount effective for you, not to exceed one full tablet.  It can also be used situationaly and does not have to be taken every day. As always, if any questions give us a call at the [facility] telephone:  [phone].          The Frozen Food Diet  For those on the go who may have relied heavily on fast food in the past, we want to break away from that routine.  But life sometimes may limit our time for shopping/cooking or perhaps your skills in the kitchen are limited.  To help get on the right food, here are some options for using frozen food/reheatable food that may keep you on track with protein/caloric goals and keep things simple as you first jump  "into weight loss season or look to a break from your current meal routine.     The listed foods are examples of what may help keep most on track but your stores may carry different options.  Start reading labels!  As long as your frozen meals have 18-30 grams of protein per meal they should do the job. Experience suggests most of these meals will be around 280-400 calories.   The protein content is the most improtant. For those who are salt sensitive, looking at sodium content is important as well and most of us would do well to keep our daily sodium intake under 2000-2300mg anyway.   My suggestion is to find your favorite 2 frozen meals that fit the protein goal, load up, keep it simple and use them regularly 2 meals daily. A piece of fruit (apple/berries/banana) in the AM between breakfast and lunch and trying not to go more than about 5 hours between your meals should help keep things on track. For heavy exercisers or people with higher resting metabolic rates, you may feel better w/ a protein supplement in mid afternoon.  Try to let supper be your last food of the day and stick to water otherwise. If you absolutely need bubbly beverages, carbonated patel such as [place]/Bubbly/etc (\"essenced water\" without sugar/artificial sweetener) may be refreshing options as well (much better if cold).     Your grocery may carry better/different options then what is listed,  most of those are decent options, just look at the labels to make sure.      EXAMPLES of quick/frozen meals/sides:  Healthy Choice Power Bowls:  Chicken Feta and Farro:  ~$4.00. 310 kcal. Protein 23 grams. 600mg sodium, Fiber 6g. Sugar 2g.   Evol Fire Grilled steak bowls: $4.49. 400 kcal, 20g protein, 520mg sodium  fiber 8, sugar 3.    Evol Chicken Enchilada bake bowl: $4.49. 350 kcal, 21 g protein, 610mg sodium, fiber 7, sugar 3    Frontera Chicken Fajita bowl: $4.99 on sale $3.59. 260 kcal, 22 g protein, 700mg sodium, fiber 8, sugar " 8.    Frontera, Green chili grilled chicken taco bowl $4.99 on sale $3.59. 240 kcal, protein 20g, sodium 710, fiber 6g, sugar 6g.    Frontera chicken and chorizo taco bowl. $4.99 on sale $3.59. 290 kcal, 19g protein. 660 mg sodium, 7g fiber, 6 g sugar.    Melida Fernandoger, Chicken: soy based food. $4.99 for 4 patties. If using for protein source, encourage 2 patties, 280 kcal, 24 g protein 740mg sodium, fiber 6g, sugar 4g. (add to veggie mix for meal gets 310 kcal if 3/4 cup of mediterranean veggie mix).  Feel free to use condiments like ketchup/mustard or salsa.    Francisco Ramonf, chicken bueno: $4.99, $3.59 on sale. 320 kcal, 20g protein, 750mg sodium, 3g fiber, 4 g sugar    eggs: $2.99-$6.99 per dozen. 70 kcal per egg, 6 -7g protein per egg.    365 Meditarrean blend frozen veggies (5 servings of 3/4 cup): $2.69. 25kcal    365 organic broccoli florets. $2.69 4.5 servings of 1 cup. 30 kcal.      Mozarella cheese: sticks 1 oz (6 pack is $3.60 on sale if organic) 80 kcal, 8 g protein,     Salsa: 365 salsa bhandari polk chil: $2.99 per bottle, 15 servings) 2 tablespoons 10 kcal, 0 protein, 200mg sodium. fiber 0, sugar 1    Water, coffee/unsweetened tea only.  Get at least 64 oz daily. Maximum safe amount is generally up to one half of your body weight in pounds in most cases (unless you're on water restriction for cardiac issues).  Fruit: apples, berries, no more than one banana daily, grapes, watermelon/cantaloupe. Keep it to 1-2 servings daily. Serving of berries is a generous handful. Same with grapes.  Melon:  2 good sized wedges.  Wash your berries/grapes/apples before eating.  Depending on your protein and caloric needs, a person could use frozen meals 2-5 times daily. If you're 6 feet 300 lb man,  you'll likely need an extra serving or two compared to a 5 foot, 200 lb women.  Follow your appetite, hunger and energy levels and how they relate to your intake and timing of meals. Weigh yourself daily if possible.  Have a  great season!    Exercise Guidance    Nearly everything that bothers us gets better when the proper amount of exercise can be done in the proper amounts.  Getting to that level safely and without injury is the key.  When it comes to weight loss, exercise is especially important in maintaining the weight loss.  Unfortunately, one of the harsh realities is that substantial weight loss slows our metabolism, often anywhere from 5-20%.    Our brain always remembers our heaviest weight and we can return to that if we're not mindful and moving regularly.  Our biology doesn't understand the concept of having too much energy, only not having enough.  As such, when we lose weight, it's thought that the brain interprets this as we're ill or in a famine and dials back our metabolism to limit further weight loss.  This is why exercise is so important in keeping the weight off and is the main reason people have some weight regain from their low weight point after weight loss.  We have to make up that 10-20% of calories not being burned.Since we can restrict our intake for only so long, exercise becomes very important in our long term healthy weigh maintenance to balance out the occasional indiscretion with our diet.    Generally, for every 5% body weight reduction in a weight loss season, a person needs to add  kilocalories of exercise in their daily routine to keep that weight off for the long term.  This is why it's vital to be starting your fitness regimen during weight loss season, so that routine is well established as you move into your maintenance period.    Additionally, all sorts of good enzymes and genes turn on with exercise and our stress, sleep, mood and bodies feel better when we can get to the point of making ourselves a little sweaty and short of breath 35-50 minutes most days of the week. But we have to start with what we can do first and give ourselves permission to work our way up to this goal.    Who  "isn't ready for exercise? Well, if you get severe dizziness/palpitations, chest pain or short of breath/faint with even minimal activity like walking across a room or you're having to pause while going up a flight of stairs, then getting your heart and/or lungs fully evaluated prior to starting an exercise regimen is recommended. Everyone else can probably start a program, but everyone may start at a different point:  Some can set a 5-10 minute walking goal and others will be able to ride their bike for an hour.      Start with where you're at and look to add 10% more each week until you're at that 150 minutes or more a week (or 75 minutes/week or more of vigorous exercise). Moderate exercise can be estimated as the pace you can carry on a conversation and vigorous is the pace at which you can get 3-5 words out before having to take a breath.  If you're using heart rate monitoring, Moderate is about 60% of your maximum heart rate and vigorous about 75%. (Max heart rate estimated as 220 beats minus your age:  Example: 220-age of 46 =176 Beats per minute (BPM) maximum. 0.6X 176= 105 BPM (moderate), 132 BMP(vigorous)).    If you like to count steps, the 10,000 steps per day does correlate well with weight maintenance but try to make at least 20-25% of those steps at a brisk pace (like you are about to miss your bus).    Finally, if you are pressed for time, it's important to know that some exercise is better than none.  High Intensity Interval training (HIIT) is a good way to get as much out of a short period of working out. If you can't walk, use the stairs, bike or swim; you could use a punching/arm workout regimen for your activity.  The idea with HIIT is to have a 3-6 minute warm up period of low intensity and the 3-6 \"intervals\" where you push the intensity up and then recover and start the next interval. One study showed that 3 intervals of 20 seconds at \"Maximum Effort\" while either biking on a stationary bike " or going up stairs and then having 100 seconds recovery time before the next Maximum Effort was equally as beneficial on cardiovascular fitness development as doing 30 minutes of moderately paced walking 3 days weekly over a 6 week period of time.  So intensity matters. You just need to be able to safely do your desired exercise without injury. There are many great HIIT exercises/routines out there. IF you're not doing much exercise currently, I recommend giving your self 2-3 weeks of moderate exercise, 3 days weekly minimum to get your bones/tendons/muscles used to exercise before going for High Intensity workouts.    If you like to use Apps on the phone, the couch to 5k [place] and 7 minute workout apps are nice places to start if you are reasonably healthy.  There are hundreds of other options out there.  Consider viewing [email or web address] if gentler exercise/movement is desired. Videos on [place] and chair yoga for seniors exist and are free. Check them out and let's get that 3-4 days a week routine going.    Let's move!  Raudel Velazquez MD.         On-the-Go Breakfast Ideas  As of 2015, the latest research shows what a huge impact eating breakfast has on losing weight and feeling your best. People lose more weight when they make breakfast their biggest meal of the day compared to Dinner, but even if you cannot go to that degree, getting a breakfast that has at least 20 grams of protein and even a moderate amount of fat is ideal for maintaining good energy through the day and limits overeating in the evening hours.  The following are some quick and easy suggestions for at least getting something of substance into your body in the morning.  Enjoy!    Eating breakfast within 90 minutes of waking up is an important part of taking care of your body.  After sleeping for hours, your body is in need of fuel.  An ideal breakfast is a combination of protein, whole-grain carbohydrates, or fruit.  Here s why:    -Protein digests very  slowly in the body, helping you feel more satisfied.  -Whole grains provide dietary fiber, which also digests slowly and helps keep your gut clean.  -Fruit is a great source of vitamins, minerals, and fiber.      Each one of these breakfast combinations has between 200-300 calories and 15-20 grams of protein.  Feel free to mix and match!    Protein: Choose  -1/2 cup low-fat cottage cheese  -2 hard boiled eggs , or one cooked in olive oil (low/slow heat).  -1 low fat string cheese stick  -1 Tablespoon natural peanut butter  -EveryRack vegetarian sausage naeem (found in freezer section)  -1 slice lowfat cheese  -6 oz 2% or lowfat Greek yogurt, such as Fage or Oikos.    PLUS    Whole Grains:  Choose   -1 whole wheat English muffin  -1 whole wheat joseph, half  -1/2 Fiber One frozen muffin, thawed  -1/2 Fiber One toaster pastry  -1 whole wheat bagel thin  -1/2 cup Kashi cereal  -1 Kashi waffle (or other whole grain high-fiber waffle)    OR    Fruit: Choose  -1/3 cup blueberries  -1/2 banana (or a plantain- similar to a banana, yet smaller)  -1/2 cup cantaloupe cubes  -1 small apple  -1 small orange  -1/2 cup strawberries    *Adapted from [employer], Fall 20    Ten Breakfasts Under 250 calories    Ideally, getting between 350-600 calories  (depending on starting height and weight)for breakfast is ideal for avoiding hunger later in the day, adjust/add to the following accordingly:    One- 250 calories, 8.5 g protein  1 slice whole-grain toast   1 Tbsp peanut butter    banana    Two- 250 calories, 8 g protein    cup nonfat/lowfat yogurt  1/3rd cup diced no-sugar peaches  1/3rd cup cereal (like Special K, Cheerios, or bran flakes)    Three- 250 calories, 25 g protein  1 egg scrambled with 1 oz skim milk    cup shredded cheddar    whole grain English muffin  1 oz Marion marx  1 tsp margarine spread    Four- 225 calories, 25 g protein  1/2 cup Kashi Go-Lean cereal    cup skim milk mixed with 1 scoop Bariatric  Advantage protein powder    cup no-sugar diced pears    Five- 250 calories, 20 g protein    cup oatmeal prepared with skim milk, 1 scoop protein powder, and sugar-free maple syrup    Six- 200 calories, 5 g protein  1 whole grain waffle, toasted  1 tablespoon creamy peanut or almond butter    Seven-  250 calories, 19 g protein  Breakfast sandwich: 1 slice whole grain toast, cut in half.  Add 1 scrambled egg and one slice cheddar  cheese.    Eight-  250 calories, 15 g protein  2 eggs scrambled with 1/3 cup frozen spinach (heat before adding to eggs) and 2 tablespoons low fat cream cheese.    Nine-  150 calories, 15 g protein  2/3rd cup cottage cheese    cup cantaloupe    Ten- 200 calories, 20 g protein  Fruit smoothie made with 4 oz. nonfat Greek yogurt,   cup berries, 1 scoop protein powder, and 4 oz skim milk.    Ten Lunches Under 250 Calories    Aim for lunch to be around 300-400 calories a day when trying to lose weight and get that protein in!    One- 200 calories, 11 g protein  1/3 cup tuna salad made with light [place] on 1 slice whole grain bread  1 small peeled apple    Two- 250 calories, 16 g protein  1/3 cup lowfat cottage cheese    cup cooked green beans    small fruit cocktail (in natural juice)    Three- 200 calories, 11 g protein    grilled cheese sandwich on whole grain bread with lowfat cheese  2/3rd cup of tomato soup    Four- 250 calories, 22 g protein  Deli wrap: 1 oz sliced turkey, 1 oz sliced ham, 1 oz sliced chicken rolled up with 1 slice low-fat cheese  1 small orange    Five- 250 calories, 28 g protein  2/3rd cup chili with 1 oz shredded cheese  4 saltine crackers    Six- 250 calories, 22 g protein  1 cup fresh spinach with 2 oz chicken, 1/3rd cup mandarin oranges, and 2 tablespoons sliced almonds with 1 tablespoon light vinaigrette dressing    Seven- 200 calories, 11 g protein  1 Tbsp sugar-free preserves and 1 Tbsp peanut butter on 1 slice whole grain toast    cup nonfat/lowfat Greek  yogurt    Eight- 250 calories, 18 g protein  1 small soft-shell chicken taco with 1 oz shredded cheese, lettuce, tomato, salsa, and 1 Tbsp light sour cream    cup black beans    Nine- 225 calories, 13 g protein  2 ounces baked chicken  1/4 cup mashed potatoes    cup green beans    Ten- 200 calories, 21 g protein  Deli joseph: 2 oz roast beef or other deli meat with 1 tsp Jonnathan mayonnaise and sliced tomato, onion, and lettuce  1/3rd cup cottage cheese      Ten Dinners Under 300 calories    If you're eating a large breakfast and medium lunch, keep dinner small.  300-400 calories is ideal for most people depending on their caloric needs.    One- 300 calories, 12 g protein  1-inch thick slice of turkey meatloaf    cup baked butternut squash    Two- 200 calories, 9 g protein  Bread-less BLT: 3 slices turkey marx, sliced tomato, wrapped in a large lettuce leaf    cup peeled fruit    Three- 275 calories, 36 g protein  3 oz roasted chicken    cup cooked broccoli    cup shredded cheddar cheese    cup unsweetened applesauce    Four- 200 calories, 25 g protein  3 oz baked tilapia  1/3rd cup cooked carrots    cup yogurt    Five- 250 calories, 20 g protein  Grilled ham  n  Swiss: spread 2 tsp light margarine on 1 slice of whole grain bread.  Cut bread in half, layer 2 oz deli ham with 1 piece of Swiss cheese and grill until cheese is melted.    cup cooked vegetables    Six- 250 calories, 18 g protein  Vegetarian cheeseburger: 1 [place] cheeseburger topped with lettuce, onion, tomato, and ketchup/mustard    cup sweet potato fries    Seven- 250 calories, 18 g protein  Pork pot roast: 2 oz roasted pork loin, 1/3rd cup roasted carrots,   medium potato, cooked with   cup gravy    Eight- 300 calories, 25 g protein  2 oz meatballs (about 2 small meatballs)    cup spaghetti sauce  1/2 piece toast topped with 1 tsp light margarine and topped with garlic powder, toasted in oven    Nine- 250 calories, 16 g protein  Mexican pizza: one 8  corn  tortilla topped with 2 oz chicken,   cup salsa, 2 tablespoons black beans, 2 tablespoons shredded cheese.  Bake until cheese is melted.    Ten- 250 calories, 22 g protein  Shrimp stir-de oliveira: 3 oz cooked shrimp, 1/6th onion,   pepper,   cup chopped carrots sautéed in 1 tablespoon olive oil, topped with 2 tablespoons stir de oliveira sauce and a pinch of sesame seeds

## 2021-06-06 NOTE — PROGRESS NOTES
"Bariatric Clinic Follow-Up Visit:    Ivett Aden is a 49 y.o.  female with Body mass index is 41.22 kg/m .  presenting here today for follow-up on non-surgical efforts for weight loss. Original Intake visit occurred on 4/18/19 with a weight of 245 lbs and BMI of 44.2.  Along with diet and behavior changes, she has been using phentermine to assist her weight loss goals.  See her intake visit notes for details on identified contributors to weight gain in the past.  She's been lost to follow up since August when she was 215 lbs. RMR of 1648kcal last summer w/ protein goal of 60-90g/day.    Weight:   Wt Readings from Last 2 Encounters:   03/11/20 (!) 229 lb (103.9 kg)   08/30/19 215 lb (97.5 kg)    pounds  Height: 5' 2.5\" (1.588 m) (3/11/2020  2:29 PM)  Initial Weight: 245 lbs (8/30/2019 10:55 AM)  Weight: (!) 229 lb (103.9 kg) (3/11/2020  2:29 PM)  Weight loss from initial: 30 (8/30/2019 10:55 AM)  % Weight loss: 12.24 % (8/30/2019 10:55 AM)  BMI (Calculated): 41.2 (3/11/2020  2:29 PM)  SpO2: 98 % (3/11/2020  2:29 PM)  Waist Circumference (In): 47 Inches (4/18/2019 10:25 AM)  Hip Circumference (In): 55 Inches (4/18/2019 10:25 AM)  Neck Circumference (In): 15 Inches (4/18/2019 10:25 AM)      Comorbidities:  Patient Active Problem List   Diagnosis     EMMA on CPAP     Class II obesity       Current Outpatient Medications:      azelastine (ASTELIN) 137 mcg (0.1 %) nasal spray, 1 spray., Disp: , Rfl:      CALCIUM-MAGNESIUM-ZINC ORAL, Take by mouth., Disp: , Rfl:      cholecalciferol, vitamin D3, (VITAMIN D3 ORAL), Take 125 mcg by mouth daily.    , Disp: , Rfl:      fluticasone propionate (FLONASE) 50 mcg/actuation nasal spray, , Disp: , Rfl: 6     Lactobacillus acidophilus (PROBIOTIC ORAL), Take by mouth., Disp: , Rfl:      levonorgestrel (MIRENA) 20 mcg/24 hours (5 yrs) 52 mg IUD, 1 each by Intrauterine route., Disp: , Rfl:      NON FORMULARY, daily., Disp: , Rfl:      phentermine (ADIPEX-P) 37.5 mg tablet, Start " with 1/2 a tablet daily (18.75mg)., Disp: 60 tablet, Rfl: 0     vitamin E acetate (VITAMIN E ORAL), Take by mouth., Disp: , Rfl:       Interim: Since our last visit, she has gained 14 lbs. .  Previously tracking steps and reports her exercise over these last 7 months has been continuing to get 10-12k per day per her report. Describes increased comfort/holiday eating/high carb eating as main  for her weight gain.  Son is planning to go away to college next year.  She's using situational phentermine, half tab daily but misses a lot of days/forgets. Reviewed RMR data/goals and it turns out she's trying to use yogurt for breakfast and lunch and under restricting. Reviewed goal of 1200-1300kcal/day w/ 70-80g/day protein.   Plan:   1.  Welcome back and to get to our 200 lb abad goal from last summer, finding our routine with meals/timing and limiting temptation as best we can will help. Aiming for 25 grams of protein 3 meals daily, every 5 hours, hydrating well in between meals and getting your 70-80 oz of water daily.    2. Restart phentermine. Half tablet daily, no later than 1pm if desiring to go to bed by 11pm.    3. Recheck D levels in the summer.    4. Remember not to over restrict, with this style diet, getting about 300-400 calories every 5 hours makes the comfort eating desire less if getting your 25 grams of protein at each meal.    We discussed [facility] Bariat[facility] Basics including:  -eating 3 meals daily  -eating protein first  -eating slowly, chewing food well  -avoiding/limiting calorie containing beverages  -We discussed the importance of restorative sleep and stress management in maintaining a healthy weight.  -We discussed the National Weight Control Registry healthy weight maintenance strategies and ways to optimize metabolism.  -We discussed the importance of physical activity including cardiovascular and strength training in maintaining a healthier weight and explored viable options.    Most  "recent labs:  No results found for: WBC, HGB, HCT, MCV, PLT  Lab Results   Component Value Date    CHOL 197 07/20/2018     Lab Results   Component Value Date    HDL 43 (L) 07/20/2018     Lab Results   Component Value Date    LDLCALC 125 07/20/2018     Lab Results   Component Value Date    TRIG 145 07/20/2018     No components found for: CHOLHDL  No results found for: ALT, AST, GGT, ALKPHOS, BILITOT  No results found for: HGBA1C  Lab Results   Component Value Date    KKBTGDCC09 459 06/14/2019     Lab Results   Component Value Date    WJUTBPXA38JI 33.5 06/14/2019     No results found for: FERRITIN  No results found for: PTH  No results found for: 01350  No results found for: 7597  Lab Results   Component Value Date    TSH 2.61 07/20/2018     No results found for: TESTOSTERONE    DIETARY HISTORY    Positive Changes Since Last Visit: continuing to walk regularly.  Struggling With: comfort eating    Knowledgeable in Reading Food Labels: reviewed goals  Getting Adequate Protein: no  Sleeping 7-8 hours/day often 7  Stress management fair    PHYSICAL ACTIVITY PATTERNS:  Cardiovascular: walking  Strength Training: minimal    REVIEW OF SYSTEMS  GENERAL/CONSTITUTIONAL:  nl  HEENT:   nl  CARDIOVASCULAR:   nl  PULMONARY:   nl  GASTROINTESTINAL:  nl  UROLOGIC:  nl  NEUROLOGIC:  nl  PSYCHIATRIC:  Tolerates phentermine. Stress level OK  MUSCULOSKELETAL/RHEUMATOLOGIC   no injury  ENDOCRINE:  Using D3 daily, 4000IUs  DERMATOLOGIC:  No rash  Reviewed toolbox approach to weight loss.  PHYSICAL EXAM:  Vitals: /76 (Patient Site: Right Arm, Patient Position: Sitting, Cuff Size: Adult Large)   Pulse 78   Ht 5' 2.5\" (1.588 m)   Wt (!) 229 lb (103.9 kg)   SpO2 98%   Breastfeeding No   BMI 41.22 kg/m    Height: 5' 2.5\" (1.588 m) (3/11/2020  2:29 PM)  Initial Weight: 245 lbs (8/30/2019 10:55 AM)  Weight: (!) 229 lb (103.9 kg) (3/11/2020  2:29 PM)  Weight loss from initial: 30 (8/30/2019 10:55 AM)  % Weight loss: 12.24 % (8/30/2019 " 10:55 AM)  BMI (Calculated): 41.2 (3/11/2020  2:29 PM)  SpO2: 98 % (3/11/2020  2:29 PM)  Waist Circumference (In): 47 Inches (4/18/2019 10:25 AM)  Hip Circumference (In): 55 Inches (4/18/2019 10:25 AM)  Neck Circumference (In): 15 Inches (4/18/2019 10:25 AM)      GEN: Pleasant, well groomed, in no acute distress  HEENT: PEERL, EOMI, .  NECK: No swelling.  HEART: Rhythm regular, rate regular, no murmur   LUNGS: Clear without crackles or wheezes. No cough.  ABDOMEN: nontender. Obese..  EXTREMITIES: 2 plus palpable peripheral pulses, radial.  NEURO: Alert and Oriented X3, normal gait and speech.  SKIN: No visible rashes.        25 minutes was spent in direct consultation, with over 50% of it spent in counseling regarding their plan for excess weight reduction and health modification.  Raudel Velazquez MD  [facility]  2:32 PM

## 2021-06-11 NOTE — TELEPHONE ENCOUNTER
Called patient to let her know that we would like to see her in follow-up prior to being able to refill her phentermine since it has been over 6 months.  Patient verbalized understanding and we switched her visit to in person because she doesn't want to do it over the phone.  She needs the accountability of stepping on a scale.  Dede Ware RN

## 2021-06-12 NOTE — PATIENT INSTRUCTIONS - HE
Plan:  1. Aiming for 3 meals daily about every 4.5-5.5 hours with supper being your last food of the day. Evening sips of water/tea or essenced carbonated water would be fine (no artificial sweeteners).   Each meal should have about 25 grams of protein daily and try to get 1-2 servings of healthy fat each day (olive oil/nuts/seeds/avocado or fatty fish like salmon/sardines/mackerel).     2. Start walking daily 10-20 minutes outside if possible. Dress appropriately and embrace winter.    3. Restart phentermine at a quarter to half a tablet daily AFTER breakfast and no closer than 12 hours before your bedtime to start. Don't use if sick/ill/using cold medications or other stimulants.    4. Trial of Plenity. Take 3 capsules (one pod) 20-30 minutes before Lunch And Supper with 16 oz of water.  Rx was faxed and they should contact you (see below for flow).     5. Recheck in 6 weeks to check efficacy/tolerance. Try to weigh yourself weekly at minimum.    6. You can recheck your thyroid, metabolic panel/electrolytes, vitamin D levels anytime this fall (orders placed today). [id number] or the [facility] lab can get those done for you if you call them and set up a lab draw vitamin.       Kind Regards,  Raudel Velazquez MD  [facility]        To help lose weight in a safe way and sustainable way, I'd like to start you on a 1300 calorie diet each day.  Understanding that every 3500 calorie deficit adds up to a pound of weight reduction, with the combination of light aerobic exercise, some modest weight training and diet, we should be able to lose around 1 to 2.5lbs weekly depending on your starting height and weight and exercise routine with this goal intake. Dropping abut 1% total body weight weekly is exceptional weight loss and very sustainable once in a good routine.    Hunger and fatigue are the enemies of weight loss and behavior change in general.  We become  much more reactive in our eating when we're hungry and tired and decrease our levels of self control.  It's not a personal failing, it's just body chemistry.   We can combat this by trying to avoid being tired and hungry at the same time.  To help this,  try to front load your calories in the first 10 hours of you day so you get into the fatigued evening hours reasonably full and you can control impulses/mindless eating a lot better and avoid those bedtime snacks/evening treats that the tired brain craves.  If you can getting your exercise in the beginning of your day has also been shown to have superior results (but anytime is better than none).  We want to build up to 150 minutes or more as you progress through the first 2-3 months of weight loss season (300 minutes weekly is ideal for maintaining weight loss for most after the end of weight loss season).     Weight loss goes through ups and downs and plateaus but if you stay on the program, you will enjoy success.   Commit to the process, try to be on track at least 19 days out of 20 and continue to think about why you're doing this and what you're working towards. If you haven't thought of your reward for hitting your weight loss goals, think about it now. Using these little victory bribes along the way helps a lot.    Finding a diet that is satisfying and repeatable-- day in and day out, improves success.  The following uses the concept of Daily Caloric Restriction, eating less every day.  An outline of how to break up the day's food is given below.  It is a starting point and example of what a 1300 calorie diet looks like.  You can modify the listed foods to suite your particular tastes, but pay attention to portions and protein content.   Do not skip breakfast on this plan as it will leave you hungry and lead to overeating at some point during the rest of the day.  If you can make supper the last food for the day more days of the week then not, it will help a  lot.  That means that the intake during those first 10-12 hours of the day and hitting your protein/intake targets for all three meals is vital to your success and evening hunger control.     Start reading labels so you know that you're getting what you think you are and start measuring foods so you can eventually look at a portion and understand how it will provide the fuel you want.    Prepping raw veggies after you buy them (washing and putting into bags/tupperware for easy access), cooking several chicken breasts/proteins for the next 2-3 lunches and generally being able to grab and go what will keep you on target when time is short will greatly aid your success.  Prepare and plan ahead and success will follow. It really only requires a couple days weekly to optimize the access to the right food at the right time of day.  Food delivery and stopping at fast food is a sign of reactive eating and usually will signal a stalling of your weight loss.    Read labels:  for protein portions/yogurt, protein bars etc looking for items with more than 10 grams of protein and less than 10 grams of sugar is very helpful.  Frozen meals should have at least 18 grams of protein, under 10 grams of sugar as well (typically around 300-380 calories).    Please note: if you've had previous bariatric surgery: wait 20-30 minutes before/after eating to drink your beverages to avoid early fullness/dumping syndrome/worsening malabsorption, early loss of fullness and hunger.  Start with eating your protein first, slow down your meals and chew thoroughly.          1300 calorie diet:  Think Big Breakfast, Medium Lunch, smaller dinner.  Note: it's OK to consolidate the calories/protein of the mid morning snack with breakfast and the midafternoon snack with lunch if time doesn't allow or if your don't wish to have those snacks. No snacks recommended after supper. If you're prone to late afternoon nibbling, that is a great time to get your  fitness in so you can get to supper without the extra.    Breakfast goal of 300 calories-350 calories (egg, 1/3 to 1/2 cup cooked old fashioned oatmeal or steel cut oats and berries,3-4oz greek yogurt.)Glass of water and if you like coffee (black) or tea.        Mid morning Snack or part of lunch, about 2-2.5 hrs later: 100 calories (cottage cheese/string cheese/ fruit or banana) and a handful of cruchy/green veggies (cucumber/celery/green peppers/broccoli).  Small glass of water    Lunch:  300 calories (3.5-4 oz tuna with little [place] (no bread), apple, salad with drizzle of olive oil/balsamic vinegar for example)  Water    Snack:  100 calories.  4-5 oz Greek Yogurt with at least 17 grams of protein per serving.    Or Cottage cheese (lower sodium version preferable). Get this in about 2 hrs before you plan to have dinner. Protein drinks with at least 15-20 grams of protein and less than 6 grams of sugar could be used here to hit protein goals and decrease afternoon/evening hunger.  Glass of water    Dinner:  350 calories (4 oz meat or fish with cooked veggies or salad with minimal dressing, one piece of bread).  Glass of water or unsweetened tea with lemon  Dessert: 100 calories Medium Apple or Small handful of nuts, about 2/3 of an ounce (almonds/walnuts/cashews or pistachios are ideal).   Glass of water.    Try to avoid all soda and juice (low sodium V8 ok once a day).   You can do it! Embrace the healthier you and give up the inflammatory sugary treats that accelerate disease.    Choose an activity that is fun/interesting and available to you such as  Going for a swim for 15 minutes, walking 40 minutes, elliptical 20 minutes or cycling 20 minutes as many days a week as you can.  Having a walking or workout jose can make it even more enjoyable and keep you on track the days your motivation/energy may be lower.  If those times are too long for your fitness level, start at 10 minutes of movement and each week try  to increase by 2 minutes each week.  The first 70 minutes a week (10 minutes a day only) of exercise drops the mortality rate of a sedentary person 30%!!!!  Our goal is to work up to 150 minutes or more per week of moderate to vigorous exercise  to optimize metabolism and prevent weight regain during maintenance. If time is short and your fitness allows it, high intensity interval training can be a nice way to cut the workout time in half:  warm up 2-4 minutes then 3-6 intervals of increased intensity effort (70% of max heart rate) followed by an equal amount or more of recovery before repeating, then 1-3 minutes of cooldown.     10 minutes of weight lifting can be helpful as well or using some body weight exercises like wall squats, pushups (with assistance as needed or standing/wall pushups), seat presses, yoga moves. At least twice weekly helps maintain a good strength to weight ratio, more days is better.  [employer] is a great resource for free video demonstrations.    If you are into strenuous weight lifting or prolonged exercise, use an online calculator for how many calories you've burned and if your exercise is lasting over 60 minutes, replace 20-30% of those calories burned immediately after exercise .  For the more limited exercise (less than 500 calories burned), there is no need to add extra food unless you notice a lot of hunger on your food journal.  Usually  Even a  calorie load after longer workouts is more than adequate.  For longer efforts, hunger will increase if you don't refuel afterwards and can get meal plan off track due to hunger, so replenish immediately after the workout to keep on the diet plan and feeling good.    Exercise example:  If you burned 1000 calories during the exercise, immediately (within 30 minutes) have a snack/replacement beverage totaling 200-300 calories and ideally have a 3:1 ratio of carbohydrate grams to protein grams to keep muscles ready for exercise the next  "day.  Have your next, full meal within 2-3 hours of exercise.    Tips for success:  KEEP A FOOD JOURNAL and a log of daily weights.  Pencil and paper works fine for most. Otherwise, Myfitnesspal, fitbit, SweetSpot WiFi, Loseit, garmin are all good tracker apps/programs or websites for food/fitness.  Remembering to ask yourself, \"How did my nourishment affect me today\" and comparing \"good days\" to \"hungry days\" to solidify what helps your body, in your life, feel it's best while losing weight.    1.  Prepare proteins ahead of time (broil chicken breasts in bulk so you can grab and go), steel cut oats can be stored in casserole dish/bowl in the fridge for quick scoop in the morning and rewarm in microwave, make use of crock pot recipes (watch salt content).    2.  Drink a 8-12 oz glass of water every 2-3 hours when awake.  We often mistake hunger for thirst, especially when losing weight.    3. Remember your Reward and Motivation when things get hard.    4.  Weigh yourself every morning and record, you'll stay on track better and learn how your body loses weight. Don't worry about 1 or 2 day patterns, but when on track you'll notice good trend downward of weight over 3-4 day segments.    5. Call or use [employer] messaging if problems/concerns .    6.  Find a handful of meals/foods that keep you on track and get into a boring routine that is sustainable for you.    7.  Take a complete multivitamin just to make sure all micronutrients are adequate during weight loss.    8. If losing hair/brittle nails it often means you are not taking enough protein.  Minimum goal is 60 grams daily of protein, most people with normal kidneys do well with upwards of 100-120 grams/day of protein. Consider taking Biotin as supplement or a \"Hair and Nail\" multivitamin.  If you are hypothyroid and losing hair, see you doctor for a check up of your levels if you haven't had one recently.    9.  Getting adequate sleep is very important for starting your " day properly, when we are sleep deprived, our morning appetite is suppressed and without eating an adequate breakfast, we overeat later in the day when we're tired.  Our body heals in our sleep and our mental and immune health depends on this rest.  Aim for 7-8 hours of sleep nightly if possible.  If you sleep is disturbed, perform some introspection on stressors/depression/anxiety/PTSD events or possible sleep disorders and we can trouble shoot solutions/evaulations if issues persist.    Exercise during the day, meditative breathing before bed and after waking and removing the television from the bedroom are easy ways to improve quality of sleep.      10. Relaxation.  Controlled breathing exercises can lower stress levels in the brain.  One technique is 4, 7, 8 breathing:  Place the tip of your tongue behind your front teeth, breath in through your mouth for 4 seconds, Hold it for 7 seconds and breath out slowly, making a leaky tire noise for 8 seconds.  Repeat 4 times.  Ideally do at the start and end of your day or if feeling stressed.  It works and it's why meditation/yoga/martial arts are often very breath based activities.  There are breathing techniques for alertness as well as relaxation out there and they can be quite helpful.        LEAN PROTEIN SOURCES  Getting 20-30 grams of protein, 3 meals daily, is appropriate for most people, some need more but more than about 40 grams per meal is not useful.  General rule is drinking one ounce of water per gram of protein eaten over the course of the day:  70 grams of protein each day, drink 70 oz of water.  Protein Source Portion Calories Grams of Protein                           Nonfat, plain Greek yogurt    (10 grams sugar or less) 3/4 cup (6 oz)  12-17   Light Yogurt (10 grams sugar or less) 3/4 cup (6 oz)  6-8   Protein Shake 1 shake 110-180 15-30   Skim/1% Milk or lactose-free milk 1 cup ( 8 oz)  8   Plain or light, flavored soymilk 1 cup   7-8   Plain or light, hemp milk 1 cup 110 6   Fat Free or 1% Cottage Cheese 1/2 cup 90 15   Part skim ricotta cheese 1/2 cup 100 14   Part skim or reduced fat cheese slices 1 ounce 65-80 8     Mozzarella String Cheese 1 80 8   Canned tuna, chicken, crab or salmon  (canned in water)  1/2 cup 100 15-20   White fish (broiled, grilled, baked) 3 ounces 100 21   [place]/Tuna (broiled, grilled, baked) 3 ounces 150-180 21   Shrimp, Scallops, Lobster, Crab 3 ounces 100 21   Pork loin, Pork Tenderloin 3 ounces 150 21   Boneless, skinless chicken /turkey breast                          (broiled, grilled, baked) 3 ounces 120 21   [place], [place], [place], and Venison 3 ounces 120 21   Lean cuts of red meat and pork (sirloin,   round, tenderloin, flank, ground 93%-96%) 3 ounces 170 21   Lean or Extra Lean Ground Turkey 1/2 cup 150 20   90-95% Lean [place] Burger 1 naeem 140-180 21   Low-fat casserole with lean meat 3/4 cup 200 17   Luncheon Meats                                                        (turkey, lean ham, roast beef, chicken) 3 ounces 100 21   Egg (boiled, poached, scrambled) 1 Egg 60 7   Egg Substitute 1/2 cup 70 10   Nuts (limit to 1 serving per day)  3 Tbsp. 150 7   Nut [place] (peanut, almond)  Limit to 1 serving or less daily 1 Tbsp. 90 4   Soy Burger (varies) 1  15   Garbanzo, Black, [place] Beans 1/2 cup 110 7   Refried Beans 1/2 cup 100 7   Kidney and Lima beans 1/2 cup 110 7   Tempeh 3 oz 175 18   Vegan crumbles 1/2 cup 100 14   Tofu 1/2 cup 110 14   Chili (beans and extra lean beef or turkey) 1 cup 200 23   Lentil Stew/Soup 1 cup 150 12   Black Bean Soup 1 cup 175 12         50 Things to do Instead of Snacking  We'll all have urges to snack sometimes. Hunger, mood, stress, boredom and distraction are common triggers so being mindful and thinking about what is driving a particular urge to snack at a particular time can be helpful for reducing the urge in the future.  Remember, the urge to snack doesn't  have to be obeyed. The urge exists, but you can watch it pass. Over time, you will get good at the exercise of experiencing an urge, acknowledging it, but letting it pass you by and float away.  Until you get there, here are some activities to pass the 3-5 minutes that most urges last. Good hydration is always helpful.  If you do struggle with impulse/urge control, consider some therapy based on cognitive behavioral therapy or ACT (Acceptance and Commitment Therapy).  Apps/subscriptions like [employer] emphasize some of these tools and can be of help for those able to afford the [place]/subscription.  1. Imagine the new healthier you   2. Walk around the block   3. Call a friend   4. Make a list of your Top Ten Reasons to Lose Weight   5. Make a To Do list   6. Turn on music and dance   7. Jot a thank you note to someone   8. Go to bed early or take a nap  9. Read a book   10. Blog or journal  11. Give yourself a manicure or pedicure   12. Plan a healthy meal for your family   13. Surf the Internet   14. Finish an unfinished project   15. Walk your dog, pet your cat, feed your fish  16. Brush your teeth   17. Balance your checkbook   18. Say a prayer   19. Chop veggies for later use.  20. Give a massage   21. Clean out a junk drawer   22. Play a game with your kids   23. Try a new route on your walk     24. Drink a glass of water   25. Kiss someone   26. Try on some of your clothes   27. Look at old pictures   28. Rent a video   29. Wash your car   30. Take a hot, soothing bath   31. Update your calendar   32. Work in your yard   33. Start your holiday shopping list   34. Count your blessings   35. Write a letter   36. Fold some laundry   37. Check your e-mail   38. Give your dog a bath   39. Send a birthday card   40. Meditate   41. Hug someone   42. Rearrange some furniture   43. Light a fire or some candles   44. Put your pictures in an album   45. Plan a trip (real or imaginary)  46. Straighten a closet   47. Clean out a  "files   48. Visit a friend  49. Clean out your trunk  50. Do something nice for someone     High Intensity Interval Training (HIIT):    To feel our best, our bodies need to move. Our mental health, sleep, memory, immune function, stress coping and metabolic health depend on exercise and its benefits for optimizing how our body works best. In the modern world, most do not get nearly enough exercise.  However, it's important to understand that ANYTHING is better than nothing and if you can get started with a routine for fitness, even a little bit has some benefit compared to none.  The more you do, the better (up to 20 hours weekly, beyond that the benefit tails off), but the NIH guidelines for all adults in [place] is to work up to 150-200 minutes of moderate aerobic activity each week to improve our health.  If you're a person that struggles with time pressure/lack of interest then those 2.5-3 hours weekly may be too much to ask.  So, we have to be very efficient in how we exercise to reap the benefits. It turns out that INTENSITY matters. When we use Vigorous rather than Moderate aerobic activity (Vigorous defined by a heart rate of 70-85% of calculated maximum heart rate; max heart rate equals 220 beats minus your age or the level of effort where you could talk 2-4 words before needing to take a breath), the amount of time required to reap the benefits of exercise is cut in half:  75-90 minutes/week.      A recent study showed that a 10 minute interval workout using a 3-4 minute warm up and then 20 second \"maximum effort\" followed by 1 minute, 40 seconds of recovery and then repeated two more times was as effective in improving fitness as a 30 minute brisk walk.  They utilized exercise bikes or stairs for the effort but you could adapt to whatever geography/gym/pool/bike/hill/staircase that you may have as long as you can safely do the effort without injury.   As your fitness improves, intervals of 30 seconds to 2 " minutes of increased effort followed by 1-2 minutes of recovery are options as well. The fitter you become, the harder and more intervals you'll be able to do.  Start with what you CAN do, not what you WANT to do and that will allow your body to adapt/develop fitness down the road to reach your goals.  Give yourself permission to develop a base of fitness the first 3 weeks and then you should be able to ramp up from there nicely and progressively each week.    Jumping right into a High Intensity Interval workout if you've not been having some level of exercise/fitness recently can increase your risk of injury.  To help develop some base fitness here are some options that would give you a 3-4 week base development, assuming you can walk or ride a stationary bike but haven't been doing much the last few months. 3-4 sessions each week of:      Week Warm up Interval: 3-6 repeats Cooldown   1 3-5 minutes easy walk/spin 20 seconds brisk but doable pace then recover with 90 seconds easy 2-3 minute easy walk/spin   2 3-5 minutes easy walk/spin 25 seconds brisk, 90 seconds recovery 2-3 minute easy walk/spin   3 3-5 minutes easy walk/spin 30 seconds brisk, 90 seconds recovery 2-3 minute easy walk/spin   4 3-5 minutes easy walk/spin 40 seconds brisk, 60 seconds recovery 2-3 minute easy walk/spin     *for base development, keep resistance steady and just  the speed. Once you've completed base phase, feel free to add a little extra resistance to the faster phase to increase vigorousness/heart rate.  During base phase you should be still able to talk comfortable/sing during faster pedaling/walking.     After completing the base phase, start ramping up workouts on the bike/walking. Have one easy pace workout but of longer duration (add 2-3 minutes each week to the time) each week.  One workout weekly should have an interval workout where you push your intensity working up to those 15-30 second maximum efforts and recovering  fully and then repeating for at least 3-4 intervals.  Cool down/easy pedal at the end for 1-2 minutes.     Consider a spin class if you have the means/access and remember, it's OK to rest if needed. Make the workout yours and don't focus on other people's abilities, getting started will develop your own fitness every week.  Jogging plans such as the Couch to 10k or any aerobic training program make use of similar intervals and can help you reach a fitter and more capable body regardless of where/how you perform the intervals (walking/biking/swimming/elliptical/row machine/erg arm machine, peddler, raking, lawn mowing,etc).  Go for it.    Information about the Weight Loss Medication Phentermine    When combined with a commitment to diet and behavior changes, weight loss medications can be a nice additional tool to maximize your weight loss season.  There are no magic pills and without diet and behavior changes, weight loss will be minimal.  Think of this medication as a tool to make your diet and behavior changes easier and you'll enjoy a higher probability of success.  Remember not to skip meals, but use this medication to tolerate your reduced calories more easily.  If you are very hungry in the evenings, you are likely not eating enough in the first 10 hours of your day and need to focus on getting your protein requirements in at each of your 3 daily meals.      Phentermine is a stimulant medication related to the amphetamine class of medication but with a lower risk of dependence and addiction.  It is used for weight loss by suppressing the appetite region of the brain.  It also may speed up the metabolic rate and give a person more energy.  Like any medication there are potential side effects and the most common are:  Dry mouth occurs in almost everyone (hydrate well), fewer people experience Palpatations, fast heart rate, elevation of blood pressure, restlessness, insomnia, dizziness, change in mood, tremor,  "headache, changes in bowel movements,itchiness, changes in sex drive.    Some people can develop serious side effects which include:  Heart strain (\"ischemia\").  Tachycardia (fast heart rate or irregular heart rate).  Hypertension  Pulmonary Hypertension  Psychosis  Dependency and abuse has occurred in some.  If you've been on high dose (37.5mg) for long periods, phentermine should be tapered down over a few weeks before abruptly stopping as seizures have been reported rarely.    We do not recommend taking it in combination with the following medications due to potential drug interactions which can increase the risk of side effects and/or potential for seizures:    Absolutely contraindicated are:  Amphetamines or other stimulants like ADHD medication: (dextroamphetamine, amphetamine, diethylpropion, isocarboxazid, methamphetamine, lisdexamfetamine, benzphetamine,dexmethylphenidate, methylphenidate, selegiline patch, sibutramine, tranylcypromine.    Avoid use with:   Dopamine, dobutamine, ephedra, ephedrine, epinephrine, isoproterenol, linezolid, norepinephrine, phenylephrine injection, venlafaxine (Effexor).    Monitor or modify dose with:  Acebutolol, atenolol, betaxolol, bisoprolol, carvedilol, droxidopa, esmolol, labetalol, magnesium citrate, metoprolol, nadolol, nebivolol, penbutolol, pindolol, propranolol, sotalol, timolol.    Caution with: armodafinil,betaxolol eye drops, brexpiprazole, bupropion, busulfan, caffeine, carteolol drops, enzalutaminde, ginseng, green tea, guarana, levobunolol drops, lindane cream, modafinil, afrin nasal spray (oxymetazoline), pamabrom, phenylephrine oral and nasal spray, pseudoephedrine (sudafed), rasgiline, sleegiline, bowel prep, tiagabine, timolol drops,  TRAMADOL due to increased risk of seizures.    The current cheapest place to fill your prescription is at [facility], [facility] or [facility] and is around $30 for 90 tablets.  Occasionally, [facility] and [facility] have price matched, so " call around and get the best price for you.  Other pharmacies may charge closer to$70- $100 for the same prescription. You don't have to be a member to use the pharmacy at [facility] currently.  An alternative some patients have tried is using a voucher system through [employer].  $25 paid on their website gets you a  voucher that can allows you to pick your meds up at [facility] without paying anything more.    Dosing:  We start with half a tablet for the first 2 weeks and if tolerating it without problems, you can take up to one full tablet daily in the morning after breakfast.  For those with evening hunger problems, sometimes half a tablet in the morning and half a tablet around 1-2 pm can be effective, however, risks of nighttime insomnia/restless increase with afternoon dosing so call me at the clinic if considering this regimen or having any issues.  You only have to use the amount effective for you, not to exceed one full tablet.  It can also be used situationaly and does not have to be taken every day. As always, if any questions give us a call at the [facility] telephone:  [phone].      Process for obtaining Plenity as of Fall of 2020:    Currently, Plenity is only available through a specialty pharmacy and there are a few steps to go through before you can obtain the prescription. Here's the flowsheet provided to me by the supplier, so you can have the proper expectations for how to obtain/refill/cancel your Plenity.    Initial fill    Once we have received the Plentiy fax form from you with your patients' prescription it begins a journey through our workflow process as follows:      The pharmacy technician enters the prescription into our software system.      Our Pharmacists will review the prescription and reach out to you if there are any issues.      Your patient can expect to receive a notification that we have received their prescription within 24 to 48 hours after the  prescription has been verified. We will send a text and/or an email to your patient containing a link. To ensure that there is no delay in processing the prescription you must provide an accurate/valid email and/or cell phone number to us via the Plenity fax form.       Please encourage your patient to respond to the text and/or email as soon as possible so that there is no delay in getting their prescription filled and shipped. The link will direct your patient to create an account, review the prescription order, and then continue through the checkout process by providing payment and shipping information.       If the electronic attempt(s) to reach your patient are unsuccessful we will manually call your patient and place their prescription order for them.     Please let your patients know they can contact us at [phone] or email us at [email or web address] if they have any questions or would need any further assistance.    Refills    Your patients will be enrolled in an automatic refill program for their prescription, but they have the option to opt out or be contacted before generating a refill order. We will send a text and/or email reminder letting them know that the refill is being processed and the amount that is being charge to the credit card we have on file. We strongly encourage patients to remain on the automatic refill program through the entirety of their prescription. This will ensure there is no lapse in medication coverage.    Cancellation    Patients can cancel anytime by calling [phone] or emailing [email or web address]

## 2021-06-12 NOTE — PROGRESS NOTES
"Ivett Aden is a 50 y.o. female who is being evaluated via a billable video visit.      The patient has been notified of following:     \"This video visit will be conducted via a call between you and your physician/provider. We have found that certain health care needs can be provided without the need for an in-person physical exam.  This service lets us provide the care you need with a video conversation.  If a prescription is necessary we can send it directly to your pharmacy.  If lab work is needed we can place an order for that and you can then stop by our lab to have the test done at a later time.    Video visits are billed at different rates depending on your insurance coverage. Please reach out to your insurance provider with any questions.    If during the course of the call the physician/provider feels a video visit is not appropriate, you will not be charged for this service.\"    Patient has given verbal consent to a Video visit? Yes  How would you like to obtain your AVS? AVS Preference: Mail a copy.  If dropped by the video visit, the video invitation should be sent to: Text to cell phone: [phone]  Will anyone else be joining your video visit? No  internet crashed so converted to phone visit.      Video Start Time: 10:15 AM  Tough not having a schedule. More baking/wineAdditional provider notes: a      Video-Visit Details    Type of service:  Video Visit    Video End Time (time video stopped): 10:48 AM (phone).  Originating Location (pt. Location): Home  Getting back on track, not weight herself but thinks she's at or above the 229 lbs that she was in March when COVID hit. Kids are in house. Emotional eating. Interested in restarting phentermine at half tablet, some dry mouth in the past. Interested in possibly starting Plenity as well, so Rx faxed today.  Reminded about RMR and goal of 1100-1300kcal/day and 75g of protein per day goal, good hydration and mindfulness necessary to find her " sweet spot.     Plan:  1. Aiming for 3 meals daily about every 4.5-5.5 hours with supper being your last food of the day. Evening sips of water/tea or essenced carbonated water would be fine (no artificial sweeteners).   Each meal should have about 25 grams of protein daily and try to get 1-2 servings of healthy fat each day (olive oil/nuts/seeds/avocado or fatty fish like salmon/sardines/mackerel).     2. Start walking daily 10-20 minutes outside if possible. Dress appropriately and embrace winter.    3. Restart phentermine at a quarter to half a tablet daily AFTER breakfast and no closer than 12 hours before your bedtime to start. Don't use if sick/ill/using cold medications or other stimulants.    4. Trial of Plenity. Take 3 capsules (one pod) 20-30 minutes before Lunch And Supper with 16 oz of water.  Rx was faxed and they should contact you (see below for flow).     5. Recheck in 6 weeks to check efficacy/tolerance. Try to weigh yourself weekly at minimum.     6. Continue cpap, weight loss should reduce severity.  Distant Location (provider location):  [facility]     Platform used for Video Visit: MD Raheem Gacria LPN

## 2021-06-16 PROBLEM — E66.812 CLASS II OBESITY: Status: ACTIVE | Noted: 2019-08-30

## 2021-06-16 PROBLEM — M79.605 BILATERAL LEG AND FOOT PAIN: Status: ACTIVE | Noted: 2019-03-12

## 2021-06-16 PROBLEM — M79.671 BILATERAL LEG AND FOOT PAIN: Status: ACTIVE | Noted: 2019-03-12

## 2021-06-16 PROBLEM — R60.0 BILATERAL LOWER EXTREMITY EDEMA: Status: ACTIVE | Noted: 2019-03-12

## 2021-06-16 PROBLEM — M67.979: Status: ACTIVE | Noted: 2019-03-12

## 2021-06-16 PROBLEM — M79.672 BILATERAL LEG AND FOOT PAIN: Status: ACTIVE | Noted: 2019-03-12

## 2021-06-16 PROBLEM — M79.604 BILATERAL LEG AND FOOT PAIN: Status: ACTIVE | Noted: 2019-03-12

## 2021-06-16 PROBLEM — E66.01 MORBID OBESITY WITH BMI OF 40.0-44.9, ADULT (H): Status: ACTIVE | Noted: 2019-03-12

## 2021-06-16 PROBLEM — M21.42 BILATERAL PES PLANUS: Status: ACTIVE | Noted: 2019-03-12

## 2021-06-16 PROBLEM — G47.33 OSA ON CPAP: Status: ACTIVE | Noted: 2019-08-30

## 2021-06-16 PROBLEM — M21.41 BILATERAL PES PLANUS: Status: ACTIVE | Noted: 2019-03-12

## 2021-06-16 PROBLEM — M21.6X9: Status: ACTIVE | Noted: 2019-03-12

## 2021-06-17 NOTE — PATIENT INSTRUCTIONS - HE
"Patient Instructions by Raudel Velazquez MD at 4/18/2019 10:30 AM     Author: Raudel Velazquez MD Service: -- Author Type: Physician    Filed: 4/18/2019 11:48 AM Encounter Date: 4/18/2019 Status: Signed    : Raudel Velazquez MD (Physician)       Plan:  1. Welcome to your weight loss season, to start we're going to aim for 3 meals daily with 20-25 grams of protein at EACH meal and aiming 64-80 oz daily of water. Trying to get meals in about 5 hours apart. If lots of early/pre dinner hunger, aim for 10-15gram protein snack by 3-4pm.    2. Phentermine use half a tablet no later than 10am. Stop if too stimulating: racing heart beats/headaches, anxiety, chest pains, insomnia or mood swings.     3. Follow up with dietician as planned.    4. Release of info for recent labs. We'll consider further testing depending on results.    5. Continue good activity/steps if we can add at least 2 days a week of some strength training with your machines and some meditative breathing exercise to help stress.      What makes a person succeed with dramatic and sustained weight loss?    It's being at the right point in your life where you feel the need to lose the weight, not because anyone told you so but because of a voice inside of you that says, \"I am ready for this\".  You're now at a point where you may be feeling anxious, irritable and when you look in the mirror you do not recognize the person looking back.  Your true self is buried somewhere in that reflexion and you want to free it again.  This is the sort of motivation that leads to success, and it comes from you.    Because the only person that can lose that extra weight is you, I like my patients to focus on the mindset of being in Weight Loss Season.  This gives you permission to make the changes necessary to be consistent with the diet/activity and behavior changes that lead to successful, healthy weight loss.  Nearly any diet plan can work for weight loss, but keeping " "it healthy and nutrient based to prevent deficiencies/hair loss/fatigue or irritability is vital.  If you have a plan you want to try, we'll work with you to make sure no adjustments are needed to keep you healthy through your weight loss season and working with our Bariatric Nutritionists you'll be given expert guidance to customize your diet plan to suit your particular needs. If you don't have your own ideas in mind, we are always happy to suggest well researched and validated plans that provide enough food to prevent hunger but still tap into your excess fat reserves and lose weight in a sustainable fashion.  There is great evidence that lean protein/healthy fat intake with good fiber intake while minimizing simple starches/carbs produces reliable/sustainable weight loss in most people. But some feel more connected to an intermittent fasting/fast mimicking or ketogenic diet.  These protocols can be hard for many to stick with and that's why we prefer the protein/healthy fat focused diets but if these alternative strategies appeal to you, we can work with you to optimize your knowledge and results with these tools.    Losing weight is a temporary commitment, but you need to be \"All In\" to have a good weight loss season.  To avoid frustration, you have to be willing to be nearly perfect 19 out of 20 days or even better than that. But, weight loss season is generally only 4-8 months in length. After that length of time, it can be hard to maintain a negative calorie balance and our brain, motivations and metabolism will usually bring you to a plateau that cannot be broken in this modern world where other commitments start to take priority. That's when we look to stabilize the weight loss you've achieved.  If you've reached your goal by that time, fantastic, and job well done.  If there is more to go, then after a few months of stabilization, we can usually attack that previous plateau and break into new " "territory.    Because of this time limitation, we want to really get to work right away and get into a sustainable routine ASAP.  One of the best predictors of how much weight you're going to lose throughout the season is how much you lose in the first 6 weeks, so prepare well and jump in with both feet.      Occasionally, people may feel like they cannot commit fully to the changes necessary and may want to change one thing at a time and \"get used to\" the idea of losing weight.  That is OK because that is where they are in their life, and they cannot fully commit for any number of reasons.  It's part of that internal motivation and they just haven't reached PRIORTY NUMBER ONE status yet. It's possible that what they need is more time to reach that point and I am always willing to work with people that want to \"dabble\", but understand, the amount of success obtained with half measures, is much less than half results. But Behavior Change cannot occur until a person goes through the contemplation and preparation phases necessary to have successful action and we are more than willing to give you targets to move along the spectrum and get to that point where you feel ready to  commit fully to the weight loss season.      As you go through your plan, look for things to keep your motivation rolling.  The most successful people have a goal or target/reward that they are working towards.  Having a reward that celebrates your new fitness, mobility and energy is the best sort because it will encourage you to do well with the weight maintenance phase and long term lifestyle changes that promotes keeping the weight off for the long term.  Usually, \"getting healthier\" or improving blood tests or losing weight so your clothes fit better is not as internally motivating as having a tangible reward.  A more motivating reward is one that isn't food based, is affordable, but something special:  Something you won't be getting unless " you achieve your goal.   Its important to keep to the rule of success:  in order to get the reward, your goal MUST be achieved. Write this reward down, where you can look at it daily and keep it in the front of your mind as you go through your weight loss season and it will help keep you on track.    Tools that help change behavior are vital for success. The most studied and most supported tool for weight loss is nothing more than writing down your food and weight every day.  Every Day.  Accurately and completely.  When you commit to weight loss season, this information tells you whether you're getting ENOUGH food to fuel your weight loss properly as well as teaches you the interaction between different foods you eat and how your body responds with weight loss.  You'll see that sometimes after a heavy workout you don't see the scale move until 2-3 days later.  How saltier meals (chili for example) may make you retain water for 4-5 days before you see the weight come off, you'll get used to the mini-plateaus that develop after a good 3-8 lb drop in weight as well as how you break through if you keep working the diet as you should.    Weight loss is not a linear process, there are mini ups and downs.  Learning how your body loses weight and getting comfortable with that is very rewarding. The act of writing words on paper also solidifies your will power and commitment to the season of weight loss and that by itself changes your brain chemistry/appetite, motivation and prepares you for maximal success.      Behavior change is all about getting into a new routine.  The old habits and routine have to change because without changing the circumstances of how you gained your weight, it's unlikely you'll enjoy satisfying results. If you have snacking habits, like every time you walk through the kitchen you grab a little something, well, that habit has to change and be replaced by a new habit.  It can be something as simple  "as keeping a doodle pad on the counter that you make a few scribbles and then walk through the kitchen having not opened the cupboard, or starting with a glass of water and leaving the kitchen without anything else, or checking your food journal to see how many calories you have left for the day.  Boredom is the enemy as are the old habits. Break new ground and try to push those old habits into a deep hole.      Finally, exercise always helps.  While not mandatory to lose weight, every little bit helps and exercise has so many other benefits that to not work it into your plan is to miss out on all the mood, sleep, stress and general health benefits that come from making yourself a little short of breath and sweaty at least 3-4 days out of the week.  The metabolism and calorie burn benefits aside, almost every chronic ailment in medicine gets better with proper, aerobic exercise.  Allow yourself to start slow and let your body prepare itself to accept harder training 4-6 weeks down the road, but start now and commit to a plan.  Whether you have the means to hire a , join a gym or just walk out your front door or go down to your basement for a video workout, get into a exercise routine and  after 3 weeks of at least 3 times a week exercise you should be at a point where you can slowly start ramping up 10% each week to our goal of at least 150-300minutes weekly of aerobic exercise and at least twice weekly resistance training/strenghtening with weights/bands or body weight exercises.     I am a big fan of modifying the free training plan, \"Couch to 5k\" for almost all of my patients. Just type it into [employer] or look it up on your smart phone [employer] store.  To modify the plan,  you can use the training plan for whatever aerobic activity you do (bike/treadmill/elliptical/rower/pool/etc). During the \"jog\" intervals you just move a little faster or harder, or increase the tension or incline.  You use those little " "intervals to switch up the workout and recruit more muscles and pump the blood a little more and then recover again in the \"walk\" intervals by slowing down, decreasing the incline or turning down the tension.  3-4 days a week is not that much to ask and the benefits are enormous.  Start slow and develop the base from which you can then build on and reduce the risk of injury.  It's much more important 2-4 months from now to be enjoying your exercise then it is to over exert yourself at the start and hurting yourself.  Starting slowly allows your body to accept the training better down the road when the exercise becomes crucial for weight maintenance.  Without exercise down the road during your maintenance phase, all this hard work you are about to put in can be undone. It usually takes about 100-300 calories a day of exercise to maintain a weight loss and our focus during weight loss season is to generate the routine/activities and hobbies that make that enjoyable/sustainable.    Thanks for taking this first and most important step in your weight loss season.  Commit to it and we will cheerlead you all the way to success.  When things get tough or off track we'll offer guidance and analysis and when you reach your goal we'll celebrate your success.  In the end, it is all about your success and what you do with it.      Raudel Velazquez MD  [facility]  [phone]        Weight Loss Shopping list:    Having the proper food on hand and ready to go is very important in losing weight.  Everybody has their own preferences/tastes so modify this list as you need but the following are foods that have been found to be helpful in following a calorie restricted diet.   If you are a person that doesn't \"like to eat my vegetables\", I recommend making smoothies and throwing the veggies into the  with some fruit and protein powder.      Fruits:  Generally limit to 2-3 whole fruits a day.  Do " "not buy Juice.  We depend on the fiber and chewing to slow us down and get phytonutrients/antioxidants available in the skins of fruits for health benefits.  Chewing also signals our stomach to send less hunger signals to our brains:    Apples (1-2 daily), Bananas (no more than one full banana a day), Grapes (2 handfuls a day), Clementines/oranges (one daily), berries (blue/rasp/straw:  2 handfuls a day). Watermelon (cubed for easy access, small bowl).    Vegetables:  Eating raw gets most nutrient and fiber benefits but cooked veggies are fine as well, using frozen for cooking or in smoothies is fine as well.  The more chewing/crunchiness the better the hunger control.  No limit to how many a day, but you should at least get 4 handfuls a day or more minimum.  Wash and cut up your vegetables into easy to carry, on the go sizes that are easy to put in plastic bags/pyrex and carry to work/school/etc.  Frozen veggies are just fine as well.     Broccoli, Carrots (no more than 3 a day large carrots or 2 handfuls of baby carrots, as they are very sweet), celery, cucumbers, green peppers, green beans (canned or fresh/frozen), Lettuce(all types), Beets(for roasting, will likely turn urine and stool a bit purple), asparagus.  Go crazy with the veggies, just wash well prior to eating.    Protein:  Women need at least  grams of protein a day and men at least  grams a day, more is fine.  Protein is our \"brain food\" and hunger suppressor and getting enough ensures maintenance of muscle mass during weight loss.  Vegetarians should look to balance their protein intake to get all essential amino acids and discuss with dietician if help is needed (mix of beans/soy/etc).  Protein powders or drinks count and can be a great way to control appetite during the day and easy to grab and go/carry to work/etc.  Premier Protein, BiPro, TarasWhey are all brands with high quality whey protein. For whey sensitive people, rice protein " is an option as well.    Canned tuna/sardines or anchovies.  Fresh fish/salmon the day you plan to make it.  Chicken breasts/thighs (skinless while losing weight), filet mignon steak (leaner but ) or marinade sirloin (wipe off before cooking). Eggs cooked in olive oil for breakfast is a good way to get protein and good fat in the a.m. (cook on low heat to prevent transformation of olive oil into less healthy fat).  Greek Yogurt with at least 20 grams of protein per serving and less than 11 grams of carbs/sugars.  String Cheese  Cottage Cheese: 2% or fat free per your preference.        Information about the Weight Loss Medication Phentermine    When combined with a commitment to diet and behavior changes, weight loss medications can be a nice additional tool to maximize your weight loss season.  There are no magic pills and without diet and behavior changes, weight loss will be minimal.  Think of this medication as a tool to make your diet and behavior changes easier and you'll enjoy a higher probability of success.  Remember not to skip meals, but use this medication to tolerate your reduced calories more easily.  If you are very hungry in the evenings, you are likely not eating enough in the first 10 hours of your day and need to focus on getting your protein requirements in at each of your 3 daily meals.      Phentermine is a stimulant medication related to the amphetamine class of medication but with a lower risk of dependence and addiction.  It is used for weight loss by suppressing the appetite region of the brain.  It also may speed up the metabolic rate and give a person more energy.  Like any medication there are potential side effects and the most common are:  Dry mouth occurs in almost everyone (hydrate well), fewer people experience Palpatations, fast heart rate, elevation of blood pressure, restlessness, insomnia, dizziness, change in mood, tremor, headache, changes in bowel  "movements,itchiness, changes in sex drive.    Some people can develop serious side effects which include:  Heart strain (\"ischemia\").  Tachycardia (fast heart rate or irregular heart rate).  Hypertension  Pulmonary Hypertension  Psychosis  Dependency and abuse has occurred in some.  If you've been on high dose (37.5mg) for long periods, phentermine should be tapered down over a few weeks before abruptly stopping as seizures have been reported rarely.    We do not recommend taking it in combination with the following medications due to potential drug interactions which can increase the risk of side effects and/or potential for seizures:    Absolutely contraindicated are:  Amphetamines or other stimulants like ADHD medication: (dextroamphetamine, amphetamine, diethylpropion, isocarboxazid, methamphetamine, lisdexamfetamine, benzphetamine,dexmethylphenidate, methylphenidate, selegiline patch, sibutramine, tranylcypromine.    Avoid use with:   Dopamine, dobutamine, ephedra, ephedrine, epinephrine, isoproterenol, linezolid, norepinephrine, phenylephrine injection, venlafaxine (Effexor).    Monitor or modify dose with:  Acebutolol, atenolol, betaxolol, bisoprolol, carvedilol, droxidopa, esmolol, labetalol, magnesium citrate, metoprolol, nadolol, nebivolol, penbutolol, pindolol, propranolol, sotalol, timolol.    Caution with: armodafinil,betaxolol eye drops, brexpiprazole, bupropion, busulfan, caffeine, carteolol drops, enzalutaminde, ginseng, green tea, guarana, levobunolol drops, lindane cream, modafinil, afrin nasal spray (oxymetazoline), pamabrom, phenylephrine oral and nasal spray, pseudoephedrine (sudafed), rasgiline, sleegiline, bowel prep, tiagabine, timolol drops,  TRAMADOL due to increased risk of seizures.    The current cheapest place to fill your prescription is at [facility] or [facility] and is around $30 for 90 tablets.  Occasionally, [facility] and [facility] have price matched, so call around and get the best " price for you.  Other pharmacies may charge closer to$70- $100 for the same prescription. You don't have to be a member to use the pharmacy at [facility] currently.  An alternative some patients have tried is using a voucher system through [employer].  $25 paid on their website gets you a  voucher that can allows you to pick your meds up at [facility] without paying anything more.    Dosing:  We start with half a tablet for the first 2 weeks and if tolerating it without problems, you can take up to one full tablet daily in the morning after breakfast.  For those with evening hunger problems, sometimes half a tablet in the morning and half a tablet around 1-2 pm can be effective, however, risks of nighttime insomnia/restless increase with afternoon dosing so call me at the clinic if considering this regimen or having any issues.  You only have to use the amount effective for you, not to exceed one full tablet.  It can also be used situationaly and does not have to be taken every day. As always, if any questions give us a call at the [facility] telephone:  [phone].      Exercise Guidance    Nearly everything that bothers us gets better when the proper amount of exercise can be done in the proper amounts.  Getting to that level safely and without injury is the key.  When it comes to weight loss, exercise is especially important in maintaining the weight loss.  Unfortunately, one of the harsh realities is that substantial weight loss slows our metabolism, often anywhere from 5-20%.    Our brain always remembers our heaviest weight and we can return to that if we're not mindful and moving regularly.  Our biology doesn't understand the concept of having too much energy, only not having enough.  As such, when we lose weight, it's thought that the brain interprets this as we're ill or in a famine and dials back our metabolism to limit further weight loss.  This is why exercise is so important in keeping  the weight off and is the main reason people have some weight regain from their low weight point after weight loss.  We have to make up that 10-20% of calories not being burned.Since we can restrict our intake for only so long, exercise becomes very important in our long term healthy weigh maintenance to balance out the occasional indiscretion.    Generally, for every 5% body weight reduction in a weight loss season, a person needs to add  kilocalories of exercise in their daily routine to keep that weight off for the long term.  This is why it's vital to be starting your fitness regimen during weight loss season, it becomes so vital for long term success in maintaining that weight loss.    Additionally, all sorts of good enzymes and genes turn on with exercise and our stress, sleep, mood and bodies feel better when we can get to the point of making ourselves a little sweaty and short of breath 35-50 minutes most days of the week.    Who isn't ready for exercise? Well, if you get severe dizziness/palpitations, chest pain or short of breath/faint with even minimal activity like walking across a room or you're having to pause while going up a flight of stairs, then getting your heart and/or lungs fully evaluated prior to starting an exercise regimen is recommended. Everyone else can probably start a program, but everyone may start at a different point:  Some can set a 5-10 minute walking goal and others will be able to ride their bike for an hour.      Start with where you're at and look to add 10% more each week until you're at that 150 minutes or more a week (or 75 minutes/week or more of vigorous exercise). Moderate exercise can be estimated as the pace you can carry on a conversation and vigorous is the pace at which you can get 3-5 words out before having to take a breath.  If you're using heart rate monitoring, Moderate is about 60% of your maximum heart rate and vigorous about 75%. (Max heart rate  estimated as 220 beats minus your age:  Example: 220-age of 46 =176 Beats per minute (BPM) maximum. 0.6X 176= 105 BPM (moderate), 132 BMP(vigorous)).    If you like to count steps, the 10,000 steps per day does correlate well with weight maintenance but try to make at least 20-25% of those steps at a brisk pace (like you are about to miss your bus).    Let's move!  Raudel Velazquez MD.             -For people with anxiety, stress or relaxation problems, I recommend the following breathing technique:    4,7,8 breathing is a non medication based way to decrease stress and anxiety symptoms, improve sleeping issues.  It can be done as often as necessary but I recommend at to perform it idealy at bedtime and first thing in the morning to keep your stress response in a reasonable range.  To perform 4, 7, 8 breathin.  Place the tip of your tongue lightly behind the gum of your front teeth.   2.  Breath in through your mouth for 4 seconds.  3.  Hold your breath for 7 seconds.  4.  Keeping the tongue in place, breath out through your mouth with a slight hissing noise for 8 seconds.    Repeat 4 times.  Do this when going to bed and waking up each day, or if any high stress situation arises and your stress hormones will come down.      Basic Smoothie:    Start with a good, unsweetened protein powder (BiPro, TerasWhey, or your favorite) 2 scoops is usually at least 20 grams of protein. (goal of 20-30 grams), READ THE LABEL.    Drizzle (1 tablespoon) of olive oil (120 yung) and/or 1/2 an avocado (remove pit:175 yung) for good fat/satiation.  Adjust these to fit into your particular calorie needs.    Half a banana (70 yung )    Handful of frozen blueberries/raspberries/peach slices (or all) (30-40 yung)    Small,  thumbnail sized piece of zeferino root (cut off skin) (10-20 yung)    3 leafs or more of Kale (strip away from stem and use just the leaves).  May use fresh or frozen.  Can also add spinach/parsley to mix it up.  (25  yung)    One stalk of celery torn in 2-3 pieces. Add more if desired. (5 calories)    1/3-1/2 of a cucumber cut into small sections. (15calories)    1 inch section of Red/Green/Yellow Bell pepper without the seeds. (10 calories)    Add 6 oz of water to allow for a drinkable consistency. More if needed.    Blend until smooth.  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    Rinse  right away to avoid stuck on mess.      Play around with different combinations or your personal favorites.  Try to get a bit of good fat, protein powder, fruits and veggies all in one smoothie.  Watch portion sizes   The above recipe is roughly 450-500 calories depending on how much actually goes into smoothie. This would be a good meal replacement.  If trying to lose weight with a smoothie type diet, I recommend a complete multi-vitamin to make sure all nutrients are supported. Having 2 smoothies a day and one balance evening meal such as fish/stir fried vegetables/mushrooms and a glass of water will generally keep daily calorie content between 0647-9876 calories a day.  [place] women or those with a less than 1300 calorie daily target may need to pay closer attention to portion sizes to achieve their target daily caloric intake for sustainable/durable weight loss.        On-the-Go Breakfast Ideas  As of 2015, the latest research shows what a huge impact eating breakfast has on losing weight and feeling your best. People lose more weight when they make breakfast their biggest meal of the day compared to Dinner, but even if you cannot go to that degree, getting a breakfast that has at least 20 grams of protein and even a moderate amount of fat is ideal for maintaining good energy through the day and limits overeating in the evening hours.  The following are some quick and easy suggestions for at least getting something of substance into your body in the morning.  Enjoy!    Eating breakfast within  90 minutes of waking up is an important part of taking care of your body.  After sleeping for hours, your body is in need of fuel.  An ideal breakfast is a combination of protein, whole-grain carbohydrates, or fruit.  Heres why:    -Protein digests very slowly in the body, helping you feel more satisfied.  -Whole grains provide dietary fiber, which also digests slowly and helps keep your gut clean.  -Fruit is a great source of vitamins, minerals, and fiber.      Each one of these breakfast combinations has between 200-300 calories and 15-20 grams of protein.  Feel free to mix and match!    Protein: Choose  -1/2 cup low-fat cottage cheese  -2 hard boiled eggs , or one cooked in olive oil (low/slow heat).  -1 low fat string cheese stick  -1 [employer] natural peanut butter  -Gizmo5 vegetarian sausage naeem (found in freezer section)  -1 slice lowfat cheese  -6 oz 2% or lowfat Greek yogurt, such as Fage or Oikos.    PLUS    Whole Grains:  Choose?  -1 whole wheat English muffin  -1 whole wheat joseph, half  -1/2 Fiber One frozen muffin, thawed  -1/2 Fiber One toaster pastry  -1 whole wheat bagel thin  -1/2 cup Kashi cereal  -1 Kashi waffle (or other whole grain high-fiber waffle)    OR    Fruit: Choose  -1/3 cup blueberries  -1/2 banana (or a plantain- similar to a banana, yet smaller)  -1/2 cup cantaloupe cubes  -1 small apple  -1 small orange  -1/2 cup strawberries    *Adapted from [employer], Fall 20    Ten Breakfasts Under 250 calories    Ideally, getting between 350-600 calories  (depending on starting height and weight)for breakfast is ideal for avoiding hunger later in the day, adjust/add to the following accordingly:    One- 250 calories, 8.5 g protein  1 slice whole-grain toast   1 Tbsp peanut butter    banana    Two- 250 calories, 8 g protein    cup nonfat/lowfat yogurt  1/3rd cup diced no-sugar peaches  1/3rd cup cereal (like Special K, Cheerios, or bran flakes)    Three- 250 calories, 25 g  protein  1 egg scrambled with 1 oz skim milk    cup shredded cheddar    whole grain English muffin  1 oz Kane marx  1 tsp margarine spread    Four- 225 calories, 25 g protein  1/2 cup Kashi Go-Lean cereal    cup skim milk mixed with 1 scoop Bariatric Advantage protein powder    cup no-sugar diced pears    Five- 250 calories, 20 g protein    cup oatmeal prepared with skim milk, 1 scoop protein powder, and sugar-free maple syrup    Six- 200 calories, 5 g protein  1 whole grain waffle, toasted  1 tablespoon creamy peanut or almond butter    Seven-  250 calories, 19 g protein  Breakfast sandwich: 1 slice whole grain toast, cut in half.  Add 1 scrambled egg and one slice cheddar  cheese.    Eight-  250 calories, 15 g protein  2 eggs scrambled with 1/3 cup frozen spinach (heat before adding to eggs) and 2 tablespoons low fat cream cheese.    Nine-  150 calories, 15 g protein  2/3rd cup cottage cheese    cup cantaloupe    Ten- 200 calories, 20 g protein  Fruit smoothie made with 4 oz. nonfat Greek yogurt,   cup berries, 1 scoop protein powder, and 4 oz skim milk.    Ten Lunches Under 250 Calories    Aim for lunch to be around 300-400 calories a day when trying to lose weight and get that protein in!    One- 200 calories, 11 g protein  1/3 cup tuna salad made with light [place] on 1 slice whole grain bread  1 small peeled apple    Two- 250 calories, 16 g protein  1/3 cup lowfat cottage cheese    cup cooked green beans    small fruit cocktail (in natural juice)    Three- 200 calories, 11 g protein    grilled cheese sandwich on whole grain bread with lowfat cheese  2/3rd cup of tomato soup    Four- 250 calories, 22 g protein  Deli wrap: 1 oz sliced turkey, 1 oz sliced ham, 1 oz sliced chicken rolled up with 1 slice low-fat cheese  1 small orange    Five- 250 calories, 28 g protein  2/3rd cup chili with 1 oz shredded cheese  4 saltine crackers    Six- 250 calories, 22 g protein  1 cup fresh spinach with 2 oz chicken, 1/3rd  cup mandarin oranges, and 2 tablespoons sliced almonds with 1 tablespoon light vinaigrette dressing    Seven- 200 calories, 11 g protein  1 Tbsp sugar-free preserves and 1 Tbsp peanut butter on 1 slice whole grain toast    cup nonfat/lowfat Greek yogurt    Eight- 250 calories, 18 g protein  1 small soft-shell chicken taco with 1 oz shredded cheese, lettuce, tomato, salsa, and 1 Tbsp light sour cream    cup black beans    Nine- 225 calories, 13 g protein  2 ounces baked chicken  1/4 cup mashed potatoes    cup green beans    Ten- 200 calories, 21 g protein  Deli joseph: 2 oz roast beef or other deli meat with 1 tsp Jonnathan mayonnaise and sliced tomato, onion, and lettuce  1/3rd cup cottage cheese      Ten Dinners Under 300 calories    If you're eating a large breakfast and medium lunch, keep dinner small.  300-400 calories is ideal for most people depending on their caloric needs.    One- 300 calories, 12 g protein  1-inch thick slice of turkey meatloaf    cup baked butternut squash    Two- 200 calories, 9 g protein  Bread-less BLT: 3 slices turkey marx, sliced tomato, wrapped in a large lettuce leaf    cup peeled fruit    Three- 275 calories, 36 g protein  3 oz roasted chicken    cup cooked broccoli    cup shredded cheddar cheese    cup unsweetened applesauce    Four- 200 calories, 25 g protein  3 oz baked tilapia  1/3rd cup cooked carrots    cup yogurt    Five- 250 calories, 20 g protein  Grilled ham n Swiss: spread 2 tsp light margarine on 1 slice of whole grain bread.  Cut bread in half, layer 2 oz deli ham with 1 piece of Swiss cheese and grill until cheese is melted.    cup cooked vegetables    Six- 250 calories, 18 g protein  Vegetarian cheeseburger: 1 [place] cheeseburger topped with lettuce, onion, tomato, and ketchup/mustard    cup sweet potato fries    Seven- 250 calories, 18 g protein  Pork pot roast: 2 oz roasted pork loin, 1/3rd cup roasted carrots,   medium potato, cooked with   cup gravy    Eight- 300  calories, 25 g protein  2 oz meatballs (about 2 small meatballs)    cup spaghetti sauce  1/2 piece toast topped with 1 tsp light margarine and topped with garlic powder, toasted in oven    Nine- 250 calories, 16 g protein  Mexican pizza: one 8 corn tortilla topped with 2 oz chicken,   cup salsa, 2 tablespoons black beans, 2 tablespoons shredded cheese.  Bake until cheese is melted.    Ten- 250 calories, 22 g protein  Shrimp stir-de oliveira: 3 oz cooked shrimp, 1/6th onion,   pepper,   cup chopped carrots sautéed in 1 tablespoon olive oil, topped with 2 tablespoons stir de oliveira sauce and a pinch of sesame seeds

## 2021-12-14 ENCOUNTER — APPOINTMENT (OUTPATIENT)
Dept: URBAN - METROPOLITAN AREA CLINIC 260 | Age: 51
Setting detail: DERMATOLOGY
End: 2021-12-18

## 2021-12-14 VITALS — HEIGHT: 63 IN | WEIGHT: 192 LBS | RESPIRATION RATE: 16 BRPM

## 2021-12-14 DIAGNOSIS — D18.0 HEMANGIOMA: ICD-10-CM

## 2021-12-14 DIAGNOSIS — L50.1 IDIOPATHIC URTICARIA: ICD-10-CM

## 2021-12-14 DIAGNOSIS — D22 MELANOCYTIC NEVI: ICD-10-CM

## 2021-12-14 DIAGNOSIS — L81.4 OTHER MELANIN HYPERPIGMENTATION: ICD-10-CM

## 2021-12-14 DIAGNOSIS — L30.8 OTHER SPECIFIED DERMATITIS: ICD-10-CM

## 2021-12-14 DIAGNOSIS — L82.1 OTHER SEBORRHEIC KERATOSIS: ICD-10-CM

## 2021-12-14 DIAGNOSIS — Z71.89 OTHER SPECIFIED COUNSELING: ICD-10-CM

## 2021-12-14 PROBLEM — D22.71 MELANOCYTIC NEVI OF RIGHT LOWER LIMB, INCLUDING HIP: Status: ACTIVE | Noted: 2021-12-14

## 2021-12-14 PROBLEM — D18.01 HEMANGIOMA OF SKIN AND SUBCUTANEOUS TISSUE: Status: ACTIVE | Noted: 2021-12-14

## 2021-12-14 PROBLEM — D22.5 MELANOCYTIC NEVI OF TRUNK: Status: ACTIVE | Noted: 2021-12-14

## 2021-12-14 PROCEDURE — OTHER MIPS QUALITY: OTHER

## 2021-12-14 PROCEDURE — OTHER COUNSELING: OTHER

## 2021-12-14 PROCEDURE — OTHER PRESCRIPTION MEDICATION MANAGEMENT: OTHER

## 2021-12-14 PROCEDURE — 99214 OFFICE O/P EST MOD 30 MIN: CPT

## 2021-12-14 PROCEDURE — OTHER PRESCRIPTION: OTHER

## 2021-12-14 RX ORDER — CLOBETASOL PROPIONATE 0.5 MG/G
OINTMENT TOPICAL BID
Qty: 30 | Refills: 1 | Status: ERX | COMMUNITY
Start: 2021-12-14

## 2021-12-14 ASSESSMENT — LOCATION SIMPLE DESCRIPTION DERM
LOCATION SIMPLE: LEFT HAND
LOCATION SIMPLE: LEFT PRETIBIAL REGION
LOCATION SIMPLE: RIGHT UPPER BACK
LOCATION SIMPLE: RIGHT PRETIBIAL REGION
LOCATION SIMPLE: UPPER BACK
LOCATION SIMPLE: LEFT BREAST
LOCATION SIMPLE: RIGHT HAND
LOCATION SIMPLE: ABDOMEN
LOCATION SIMPLE: RIGHT PLANTAR SURFACE

## 2021-12-14 ASSESSMENT — LOCATION DETAILED DESCRIPTION DERM
LOCATION DETAILED: RIGHT PROXIMAL PRETIBIAL REGION
LOCATION DETAILED: RIGHT INSTEP
LOCATION DETAILED: RIGHT DORSAL MIDDLE FINGER METACARPOPHALANGEAL JOINT
LOCATION DETAILED: LEFT PROXIMAL PRETIBIAL REGION
LOCATION DETAILED: EPIGASTRIC SKIN
LOCATION DETAILED: RIGHT SUPERIOR MEDIAL UPPER BACK
LOCATION DETAILED: LEFT INFRAMAMMARY CREASE (INNER QUADRANT)
LOCATION DETAILED: INFERIOR THORACIC SPINE
LOCATION DETAILED: LEFT RADIAL DORSAL HAND

## 2021-12-14 ASSESSMENT — LOCATION ZONE DERM
LOCATION ZONE: HAND
LOCATION ZONE: TRUNK
LOCATION ZONE: LEG
LOCATION ZONE: FEET

## 2021-12-14 ASSESSMENT — SEVERITY ASSESSMENT: SEVERITY: MILD TO MODERATE

## 2021-12-14 NOTE — PROCEDURE: PRESCRIPTION MEDICATION MANAGEMENT
Render In Strict Bullet Format?: No
Initiate Treatment: Clobetasol 0.05% ointment BID
Plan: Follow up in 2 weeks
Detail Level: Simple
Continue Regimen: Aquaphor occlude with gloves

## 2021-12-28 ENCOUNTER — LAB REQUISITION (OUTPATIENT)
Dept: LAB | Facility: CLINIC | Age: 51
End: 2021-12-28

## 2021-12-28 DIAGNOSIS — Z12.4 ENCOUNTER FOR SCREENING FOR MALIGNANT NEOPLASM OF CERVIX: ICD-10-CM

## 2021-12-28 PROCEDURE — G0123 SCREEN CERV/VAG THIN LAYER: HCPCS | Performed by: PHYSICIAN ASSISTANT

## 2021-12-28 PROCEDURE — 87624 HPV HI-RISK TYP POOLED RSLT: CPT | Performed by: PHYSICIAN ASSISTANT

## 2021-12-30 LAB
HUMAN PAPILLOMA VIRUS 16 DNA: NEGATIVE
HUMAN PAPILLOMA VIRUS 18 DNA: NEGATIVE
HUMAN PAPILLOMA VIRUS FINAL DIAGNOSIS: NORMAL
HUMAN PAPILLOMA VIRUS OTHER HR: NEGATIVE

## 2025-04-17 ENCOUNTER — LAB REQUISITION (OUTPATIENT)
Dept: LAB | Facility: CLINIC | Age: 55
End: 2025-04-17
Payer: COMMERCIAL

## 2025-04-17 DIAGNOSIS — I10 ESSENTIAL (PRIMARY) HYPERTENSION: ICD-10-CM

## 2025-04-17 LAB
ANION GAP SERPL CALCULATED.3IONS-SCNC: 11 MMOL/L (ref 7–15)
BUN SERPL-MCNC: 15.7 MG/DL (ref 6–20)
CALCIUM SERPL-MCNC: 9.7 MG/DL (ref 8.8–10.4)
CHLORIDE SERPL-SCNC: 104 MMOL/L (ref 98–107)
CREAT SERPL-MCNC: 0.72 MG/DL (ref 0.51–0.95)
EGFRCR SERPLBLD CKD-EPI 2021: >90 ML/MIN/1.73M2
GLUCOSE SERPL-MCNC: 105 MG/DL (ref 70–99)
HCO3 SERPL-SCNC: 24 MMOL/L (ref 22–29)
POTASSIUM SERPL-SCNC: 4.3 MMOL/L (ref 3.4–5.3)
SODIUM SERPL-SCNC: 139 MMOL/L (ref 135–145)

## 2025-04-17 PROCEDURE — 80048 BASIC METABOLIC PNL TOTAL CA: CPT | Mod: ORL

## 2025-05-02 ENCOUNTER — LAB REQUISITION (OUTPATIENT)
Dept: LAB | Facility: CLINIC | Age: 55
End: 2025-05-02
Payer: COMMERCIAL

## 2025-05-02 DIAGNOSIS — Z13.6 ENCOUNTER FOR SCREENING FOR CARDIOVASCULAR DISORDERS: ICD-10-CM

## 2025-05-02 DIAGNOSIS — Z13.1 ENCOUNTER FOR SCREENING FOR DIABETES MELLITUS: ICD-10-CM

## 2025-05-02 LAB
ANION GAP SERPL CALCULATED.3IONS-SCNC: 11 MMOL/L (ref 7–15)
BUN SERPL-MCNC: 14 MG/DL (ref 6–20)
CALCIUM SERPL-MCNC: 9.2 MG/DL (ref 8.8–10.4)
CHLORIDE SERPL-SCNC: 106 MMOL/L (ref 98–107)
CHOLEST SERPL-MCNC: 199 MG/DL
CREAT SERPL-MCNC: 0.75 MG/DL (ref 0.51–0.95)
EGFRCR SERPLBLD CKD-EPI 2021: >90 ML/MIN/1.73M2
FASTING STATUS PATIENT QL REPORTED: YES
FASTING STATUS PATIENT QL REPORTED: YES
GLUCOSE SERPL-MCNC: 94 MG/DL (ref 70–99)
HCO3 SERPL-SCNC: 23 MMOL/L (ref 22–29)
HDLC SERPL-MCNC: 41 MG/DL
LDLC SERPL CALC-MCNC: 142 MG/DL
NONHDLC SERPL-MCNC: 158 MG/DL
POTASSIUM SERPL-SCNC: 3.9 MMOL/L (ref 3.4–5.3)
SODIUM SERPL-SCNC: 140 MMOL/L (ref 135–145)
TRIGL SERPL-MCNC: 79 MG/DL

## 2025-05-02 PROCEDURE — 80061 LIPID PANEL: CPT | Mod: ORL

## 2025-05-02 PROCEDURE — 80048 BASIC METABOLIC PNL TOTAL CA: CPT | Mod: ORL

## 2025-05-15 ENCOUNTER — LAB REQUISITION (OUTPATIENT)
Dept: LAB | Facility: CLINIC | Age: 55
End: 2025-05-15
Payer: COMMERCIAL

## 2025-05-15 DIAGNOSIS — I10 ESSENTIAL (PRIMARY) HYPERTENSION: ICD-10-CM

## 2025-05-15 LAB
ANION GAP SERPL CALCULATED.3IONS-SCNC: 12 MMOL/L (ref 7–15)
BUN SERPL-MCNC: 16.1 MG/DL (ref 6–20)
CALCIUM SERPL-MCNC: 8.9 MG/DL (ref 8.8–10.4)
CHLORIDE SERPL-SCNC: 104 MMOL/L (ref 98–107)
CREAT SERPL-MCNC: 0.78 MG/DL (ref 0.51–0.95)
EGFRCR SERPLBLD CKD-EPI 2021: >90 ML/MIN/1.73M2
GLUCOSE SERPL-MCNC: 100 MG/DL (ref 70–99)
HCO3 SERPL-SCNC: 23 MMOL/L (ref 22–29)
POTASSIUM SERPL-SCNC: 3.8 MMOL/L (ref 3.4–5.3)
SODIUM SERPL-SCNC: 139 MMOL/L (ref 135–145)

## 2025-05-15 PROCEDURE — 80048 BASIC METABOLIC PNL TOTAL CA: CPT | Mod: ORL | Performed by: STUDENT IN AN ORGANIZED HEALTH CARE EDUCATION/TRAINING PROGRAM
